# Patient Record
Sex: FEMALE | Race: OTHER | URBAN - METROPOLITAN AREA
[De-identification: names, ages, dates, MRNs, and addresses within clinical notes are randomized per-mention and may not be internally consistent; named-entity substitution may affect disease eponyms.]

---

## 2023-03-28 ENCOUNTER — INPATIENT (INPATIENT)
Facility: HOSPITAL | Age: 83
LOS: 12 days | Discharge: HOME HEALTH | DRG: 827 | End: 2023-04-10
Attending: SURGERY | Admitting: SURGERY
Payer: MEDICARE

## 2023-03-28 VITALS — WEIGHT: 119.93 LBS

## 2023-03-28 DIAGNOSIS — E78.5 HYPERLIPIDEMIA, UNSPECIFIED: ICD-10-CM

## 2023-03-28 DIAGNOSIS — C77.4 SECONDARY AND UNSPECIFIED MALIGNANT NEOPLASM OF INGUINAL AND LOWER LIMB LYMPH NODES: ICD-10-CM

## 2023-03-28 DIAGNOSIS — Z88.0 ALLERGY STATUS TO PENICILLIN: ICD-10-CM

## 2023-03-28 DIAGNOSIS — Z85.038 PERSONAL HISTORY OF OTHER MALIGNANT NEOPLASM OF LARGE INTESTINE: ICD-10-CM

## 2023-03-28 DIAGNOSIS — Z85.43 PERSONAL HISTORY OF MALIGNANT NEOPLASM OF OVARY: ICD-10-CM

## 2023-03-28 DIAGNOSIS — Z92.3 PERSONAL HISTORY OF IRRADIATION: ICD-10-CM

## 2023-03-28 DIAGNOSIS — Z90.79 ACQUIRED ABSENCE OF OTHER GENITAL ORGAN(S): ICD-10-CM

## 2023-03-28 DIAGNOSIS — R73.03 PREDIABETES: ICD-10-CM

## 2023-03-28 DIAGNOSIS — Z92.21 PERSONAL HISTORY OF ANTINEOPLASTIC CHEMOTHERAPY: ICD-10-CM

## 2023-03-28 DIAGNOSIS — C79.89 SECONDARY MALIGNANT NEOPLASM OF OTHER SPECIFIED SITES: ICD-10-CM

## 2023-03-28 DIAGNOSIS — L02.214 CUTANEOUS ABSCESS OF GROIN: ICD-10-CM

## 2023-03-28 DIAGNOSIS — Z90.722 ACQUIRED ABSENCE OF OVARIES, BILATERAL: ICD-10-CM

## 2023-03-28 DIAGNOSIS — Z90.710 ACQUIRED ABSENCE OF BOTH CERVIX AND UTERUS: ICD-10-CM

## 2023-03-28 DIAGNOSIS — F32.A DEPRESSION, UNSPECIFIED: ICD-10-CM

## 2023-03-28 LAB
ALBUMIN SERPL ELPH-MCNC: 3.6 G/DL — SIGNIFICANT CHANGE UP (ref 3.3–5)
ALP SERPL-CCNC: 84 U/L — SIGNIFICANT CHANGE UP (ref 40–120)
ALT FLD-CCNC: 19 U/L — SIGNIFICANT CHANGE UP (ref 12–78)
ANION GAP SERPL CALC-SCNC: 5 MMOL/L — SIGNIFICANT CHANGE UP (ref 5–17)
APPEARANCE UR: CLEAR — SIGNIFICANT CHANGE UP
AST SERPL-CCNC: 20 U/L — SIGNIFICANT CHANGE UP (ref 15–37)
BACTERIA # UR AUTO: ABNORMAL
BASOPHILS # BLD AUTO: 0.12 K/UL — SIGNIFICANT CHANGE UP (ref 0–0.2)
BASOPHILS NFR BLD AUTO: 1.5 % — SIGNIFICANT CHANGE UP (ref 0–2)
BILIRUB SERPL-MCNC: 0.6 MG/DL — SIGNIFICANT CHANGE UP (ref 0.2–1.2)
BILIRUB UR-MCNC: NEGATIVE — SIGNIFICANT CHANGE UP
BUN SERPL-MCNC: 14 MG/DL — SIGNIFICANT CHANGE UP (ref 7–23)
CALCIUM SERPL-MCNC: 10.3 MG/DL — HIGH (ref 8.5–10.1)
CHLORIDE SERPL-SCNC: 107 MMOL/L — SIGNIFICANT CHANGE UP (ref 96–108)
CO2 SERPL-SCNC: 27 MMOL/L — SIGNIFICANT CHANGE UP (ref 22–31)
COLOR SPEC: YELLOW — SIGNIFICANT CHANGE UP
COMMENT - URINE: SIGNIFICANT CHANGE UP
CREAT SERPL-MCNC: 0.83 MG/DL — SIGNIFICANT CHANGE UP (ref 0.5–1.3)
DIFF PNL FLD: NEGATIVE — SIGNIFICANT CHANGE UP
EGFR: 71 ML/MIN/1.73M2 — SIGNIFICANT CHANGE UP
EOSINOPHIL # BLD AUTO: 0.17 K/UL — SIGNIFICANT CHANGE UP (ref 0–0.5)
EOSINOPHIL NFR BLD AUTO: 2.2 % — SIGNIFICANT CHANGE UP (ref 0–6)
EPI CELLS # UR: ABNORMAL
FLUAV AG NPH QL: SIGNIFICANT CHANGE UP
FLUBV AG NPH QL: SIGNIFICANT CHANGE UP
GLUCOSE SERPL-MCNC: 168 MG/DL — HIGH (ref 70–99)
GLUCOSE UR QL: NEGATIVE — SIGNIFICANT CHANGE UP
HCT VFR BLD CALC: 40.1 % — SIGNIFICANT CHANGE UP (ref 34.5–45)
HGB BLD-MCNC: 13.5 G/DL — SIGNIFICANT CHANGE UP (ref 11.5–15.5)
IMM GRANULOCYTES NFR BLD AUTO: 0.3 % — SIGNIFICANT CHANGE UP (ref 0–0.9)
KETONES UR-MCNC: ABNORMAL
LACTATE SERPL-SCNC: 1 MMOL/L — SIGNIFICANT CHANGE UP (ref 0.7–2)
LEUKOCYTE ESTERASE UR-ACNC: ABNORMAL
LIDOCAIN IGE QN: 140 U/L — SIGNIFICANT CHANGE UP (ref 73–393)
LYMPHOCYTES # BLD AUTO: 1.37 K/UL — SIGNIFICANT CHANGE UP (ref 1–3.3)
LYMPHOCYTES # BLD AUTO: 17.6 % — SIGNIFICANT CHANGE UP (ref 13–44)
MCHC RBC-ENTMCNC: 30 PG — SIGNIFICANT CHANGE UP (ref 27–34)
MCHC RBC-ENTMCNC: 33.7 GM/DL — SIGNIFICANT CHANGE UP (ref 32–36)
MCV RBC AUTO: 89.1 FL — SIGNIFICANT CHANGE UP (ref 80–100)
MONOCYTES # BLD AUTO: 0.73 K/UL — SIGNIFICANT CHANGE UP (ref 0–0.9)
MONOCYTES NFR BLD AUTO: 9.4 % — SIGNIFICANT CHANGE UP (ref 2–14)
NEUTROPHILS # BLD AUTO: 5.36 K/UL — SIGNIFICANT CHANGE UP (ref 1.8–7.4)
NEUTROPHILS NFR BLD AUTO: 69 % — SIGNIFICANT CHANGE UP (ref 43–77)
NITRITE UR-MCNC: NEGATIVE — SIGNIFICANT CHANGE UP
PH UR: 6 — SIGNIFICANT CHANGE UP (ref 5–8)
PLATELET # BLD AUTO: 383 K/UL — SIGNIFICANT CHANGE UP (ref 150–400)
POTASSIUM SERPL-MCNC: 3.9 MMOL/L — SIGNIFICANT CHANGE UP (ref 3.5–5.3)
POTASSIUM SERPL-SCNC: 3.9 MMOL/L — SIGNIFICANT CHANGE UP (ref 3.5–5.3)
PROT SERPL-MCNC: 7.7 GM/DL — SIGNIFICANT CHANGE UP (ref 6–8.3)
PROT UR-MCNC: SIGNIFICANT CHANGE UP MG/DL
RBC # BLD: 4.5 M/UL — SIGNIFICANT CHANGE UP (ref 3.8–5.2)
RBC # FLD: 13.8 % — SIGNIFICANT CHANGE UP (ref 10.3–14.5)
RBC CASTS # UR COMP ASSIST: NEGATIVE /HPF — SIGNIFICANT CHANGE UP (ref 0–4)
RSV RNA NPH QL NAA+NON-PROBE: SIGNIFICANT CHANGE UP
SARS-COV-2 RNA SPEC QL NAA+PROBE: SIGNIFICANT CHANGE UP
SODIUM SERPL-SCNC: 139 MMOL/L — SIGNIFICANT CHANGE UP (ref 135–145)
SP GR SPEC: 1.02 — SIGNIFICANT CHANGE UP (ref 1.01–1.02)
UROBILINOGEN FLD QL: NEGATIVE — SIGNIFICANT CHANGE UP
WBC # BLD: 7.77 K/UL — SIGNIFICANT CHANGE UP (ref 3.8–10.5)
WBC # FLD AUTO: 7.77 K/UL — SIGNIFICANT CHANGE UP (ref 3.8–10.5)
WBC UR QL: SIGNIFICANT CHANGE UP /HPF (ref 0–5)

## 2023-03-28 PROCEDURE — 99285 EMERGENCY DEPT VISIT HI MDM: CPT

## 2023-03-28 RX ORDER — VANCOMYCIN HCL 1 G
1000 VIAL (EA) INTRAVENOUS ONCE
Refills: 0 | Status: COMPLETED | OUTPATIENT
Start: 2023-03-28 | End: 2023-03-28

## 2023-03-28 RX ORDER — MORPHINE SULFATE 50 MG/1
4 CAPSULE, EXTENDED RELEASE ORAL ONCE
Refills: 0 | Status: DISCONTINUED | OUTPATIENT
Start: 2023-03-28 | End: 2023-03-28

## 2023-03-28 RX ORDER — ONDANSETRON 8 MG/1
4 TABLET, FILM COATED ORAL ONCE
Refills: 0 | Status: DISCONTINUED | OUTPATIENT
Start: 2023-03-28 | End: 2023-04-10

## 2023-03-28 RX ORDER — SODIUM CHLORIDE 9 MG/ML
1000 INJECTION INTRAMUSCULAR; INTRAVENOUS; SUBCUTANEOUS ONCE
Refills: 0 | Status: COMPLETED | OUTPATIENT
Start: 2023-03-28 | End: 2023-03-28

## 2023-03-28 NOTE — ED STATDOCS - OBJECTIVE STATEMENT
83 yo female with ho ovarian cancer years ago with radiation. She is c/o right groin pain with open, drainaing, foul smelling purulent drainage. She was called in by nurse Mariely at [facility], where she is a patient and was told to come to [facility] today but she refused instead stating she was going to [place]. She was triaged but then disappeared and could not be found for over an hour. She is non toxic appearing and agitated

## 2023-03-28 NOTE — ED ADULT TRIAGE NOTE - CHIEF COMPLAINT QUOTE
Pt. presents to ED c/o abscess on her right upper groin. Pt. reports being a pt. at [place], has had radiation to area. Pt. reports lump has been there for appx. 2 months, recently became infected. Pt. on multiple antibiotics and was told to come to ED. Denies fevers, endorses pain to area.

## 2023-03-28 NOTE — ED STATDOCS - PHYSICAL EXAMINATION
Gen:  Well appearing in pain from right groin  Head:  NC/AT  HEENT: pupils perrl,no pharyngeal erythema, uvula midline  Cardiac: S1S2, RRR  Abd: Soft, right groin with open drainaing wound, purulent, foul smelling. area approx 7cm in diameter.  Resp: No distress, CTA   musculoskeletal:: no deformities, no swelling, strength +5/+5  Skin: warm and dry as visualized, no rashes  Neuro: SOSA, aao x 4  Psych:alert, cooperative, appropriate mood and affect for situation

## 2023-03-28 NOTE — ED STATDOCS - CLINICAL SUMMARY MEDICAL DECISION MAKING FREE TEXT BOX
pt with right groin open drainaing wound at the site of previous radiation therapy for ovarian cancer, she is a pt at [facility] but refused to go there earlier today as per nurse bay that called from [facility]. she is not septic will get labs including lactate and blood cultures, also get ct with iv contrast to see the extent of this open drainaing wound, no concern for necrotizinf fasciitsi will probably admit as she has failed op antibiotics pt with right groin open drainaing wound at the site of previous radiation therapy for ovarian cancer, she is a pt at [facility] but refused to go there earlier today as per nurse bay that called from [facility]. she is not septic will get labs including lactate and blood cultures, also get ct with iv contrast to see the extent of this open drainaing wound, no concern for necrotizinf fasciitsi will probably admit as she has failed op antibiotics    pt refused ctap all night, acnnot admit to surgery or medicine without ct, pt now at [id number] agrees to ctap, will give pain med NAOMY Bradley DO

## 2023-03-29 DIAGNOSIS — L02.214 CUTANEOUS ABSCESS OF GROIN: ICD-10-CM

## 2023-03-29 PROCEDURE — 88307 TISSUE EXAM BY PATHOLOGIST: CPT

## 2023-03-29 PROCEDURE — 93010 ELECTROCARDIOGRAM REPORT: CPT

## 2023-03-29 PROCEDURE — 99223 1ST HOSP IP/OBS HIGH 75: CPT

## 2023-03-29 PROCEDURE — 83735 ASSAY OF MAGNESIUM: CPT

## 2023-03-29 PROCEDURE — 97530 THERAPEUTIC ACTIVITIES: CPT | Mod: GP

## 2023-03-29 PROCEDURE — 36415 COLL VENOUS BLD VENIPUNCTURE: CPT

## 2023-03-29 PROCEDURE — 86901 BLOOD TYPING SEROLOGIC RH(D): CPT

## 2023-03-29 PROCEDURE — 87635 SARS-COV-2 COVID-19 AMP PRB: CPT

## 2023-03-29 PROCEDURE — 84100 ASSAY OF PHOSPHORUS: CPT

## 2023-03-29 PROCEDURE — 85025 COMPLETE CBC W/AUTO DIFF WBC: CPT

## 2023-03-29 PROCEDURE — 88305 TISSUE EXAM BY PATHOLOGIST: CPT

## 2023-03-29 PROCEDURE — 85027 COMPLETE CBC AUTOMATED: CPT

## 2023-03-29 PROCEDURE — 80048 BASIC METABOLIC PNL TOTAL CA: CPT

## 2023-03-29 PROCEDURE — 97163 PT EVAL HIGH COMPLEX 45 MIN: CPT | Mod: GP

## 2023-03-29 PROCEDURE — 80053 COMPREHEN METABOLIC PANEL: CPT

## 2023-03-29 PROCEDURE — 85610 PROTHROMBIN TIME: CPT

## 2023-03-29 PROCEDURE — 86900 BLOOD TYPING SEROLOGIC ABO: CPT

## 2023-03-29 PROCEDURE — 86920 COMPATIBILITY TEST SPIN: CPT

## 2023-03-29 PROCEDURE — 88360 TUMOR IMMUNOHISTOCHEM/MANUAL: CPT

## 2023-03-29 PROCEDURE — 86850 RBC ANTIBODY SCREEN: CPT

## 2023-03-29 PROCEDURE — 97116 GAIT TRAINING THERAPY: CPT | Mod: GP

## 2023-03-29 PROCEDURE — 97162 PT EVAL MOD COMPLEX 30 MIN: CPT | Mod: GP

## 2023-03-29 PROCEDURE — 74177 CT ABD & PELVIS W/CONTRAST: CPT | Mod: 26,MA

## 2023-03-29 RX ORDER — SODIUM CHLORIDE 9 MG/ML
1000 INJECTION, SOLUTION INTRAVENOUS
Refills: 0 | Status: DISCONTINUED | OUTPATIENT
Start: 2023-03-29 | End: 2023-03-29

## 2023-03-29 RX ORDER — ENOXAPARIN SODIUM 100 MG/ML
40 INJECTION SUBCUTANEOUS
Refills: 0 | Status: DISCONTINUED | OUTPATIENT
Start: 2023-03-29 | End: 2023-04-10

## 2023-03-29 RX ORDER — ACETAMINOPHEN 500 MG
750 TABLET ORAL EVERY 6 HOURS
Refills: 0 | Status: DISCONTINUED | OUTPATIENT
Start: 2023-03-29 | End: 2023-04-06

## 2023-03-29 RX ADMIN — SODIUM CHLORIDE 1000 MILLILITER(S): 9 INJECTION INTRAMUSCULAR; INTRAVENOUS; SUBCUTANEOUS at 00:25

## 2023-03-29 RX ADMIN — SODIUM CHLORIDE 100 MILLILITER(S): 9 INJECTION, SOLUTION INTRAVENOUS at 15:33

## 2023-03-29 RX ADMIN — Medication 100 MILLIGRAM(S): at 21:21

## 2023-03-29 RX ADMIN — Medication 250 MILLIGRAM(S): at 00:29

## 2023-03-29 NOTE — ED ADULT NURSE NOTE - CHIEF COMPLAINT QUOTE
Pt. presents to ED c/o abscess on her right upper groin. Pt. reports being a pt. at [facility], has had radiation to area. Pt. reports lump has been there for appx. 2 months, recently became infected. Pt. on multiple antibiotics and was told to come to ED. Denies fevers, endorses pain to area.

## 2023-03-29 NOTE — H&P ADULT - NSHPLABSRESULTS_GEN_ALL_CORE
13.5                     139  | 27   | 14           7.77  >-----------< 383     ------------------------< 168<H>                    40.1                      3.9  | 107  | 0.83                                                                      Ca 10.3<H> Mg x     Ph x          CT Abdomen and Pelvis w/ IV Cont (03.29.23 @ 06:05)       FINDINGS:  LOWER CHEST: Mild bibasilar dependent atelectasis.    LIVER: 1.3 cm cyst in the inferior right lobe.  BILE DUCTS: Normal caliber.  GALLBLADDER: Within normal limits.  SPLEEN: Within normal limits.  PANCREAS: Within normal limits.  ADRENALS: Within normal limits.  KIDNEYS/URETERS: Within normal limits.    BLADDER: Within normal limits.  REPRODUCTIVE ORGANS: Hysterectomy.    BOWEL: No bowel obstruction. Appendix is normal.. Mild colonic stool   burden. The stomach is decompressed and cannot be assessed.  PERITONEUM: No ascites. 2.6 x 1.8 cm right pelvic sidewall cystic   structure (2:69).  VESSELS: Atherosclerotic changes.  RETROPERITONEUM/LYMPH NODES: 7 x 4 mm left external iliac lymph node   ABDOMINAL WALL: 3.6 x 3.0 cm left inguinal soft tissue mass. 3.4 x 2.8 x   3 cm rim enhancing fluid collection in the right  groin with discontinuity of the surrounding skin suggestive of draining   abscess.  Postoperative changes.  BONES: Moderate degenerative changes. Levoscoliosis.    IMPRESSION:  3.4 x 2.8 x 3 cm rim enhancing fluid collection in the right groin with  discontinuity of the surrounding skin suggestive of draining abscess.    3.6 x 3.0 cm left inguinal soft tissue mass suspicious for malignancy.    Indeterminant right pelvic sidewall cystic structure measuring 2.6 x 1.8 cm

## 2023-03-29 NOTE — ED ADULT NURSE NOTE - OBJECTIVE STATEMENT
Pt. presents to ED c/o abscess on her right upper groin. Pt. reports being a pt. at [facility], has had radiation to area. Pt. reports lump has been there for appx. 2 months, recently became infected. Pt. on multiple antibiotics and was told to come to ED. Denies fevers, endorses pain to area.
no

## 2023-03-29 NOTE — H&P ADULT - HISTORY OF PRESENT ILLNESS
Pt is a 82 F, with a medical history listed below, was told to come to the hospital by her physician, Dr Clarke for possible drainage of a right groin abscess. Pt was diagnosed with Ovarian Ca 13 yrs ago for which she was had hysterectomy with BSO at [facility] 13 yrs ago; pt does not remember the name of Surgeon. Pt also underwent chemotherapy. About 2 months ago, Pt stated that she noticed a mass in her right groin. The area started to drain purulent fluid in weeks to come then evolved into a open wound for which she needed wound care to com to the house. Pt stated that Dr Clarke sent her for a CT pelvis and said that they could not drain it in the office and sent her here.  Pt currently alone due to the sudden death of her  7 yrs ago and then the passing of her daughter 7 months after that due to cancer.    PmHx: Pre-Diabetes, Ovarian Cancer, Depression  PsHx: PITA/BSO (13 yrs ago at [facility])  All: PCN         Pt is a 82 F, with a medical history listed below, was told to come to the hospital by her physician, Dr Clarke for possible drainage of a right groin abscess. Pt was diagnosed with Ovarian Ca 13 yrs ago for which she was had hysterectomy with BSO at [facility] 13 yrs ago; pt does not remember the name of Surgeon. Pt also underwent chemotherapy and last completed a cycle about one month ago. About 2 months ago, pt stated that she noticed a mass in her right groin. Pt stated she has had multiple tests done on her and believes she had a biopsy of the left groin. The area started to drain purulent fluid in weeks to come then evolved into a open wound for which she needed wound care to com to the house. Pt stated that Dr Clarke sent her for a CT pelvis and said that they could not drain it in the office and sent her here.  Pt currently alone due to the sudden death of her  7 yrs ago and then the passing of her daughter 7 months after that due to cancer.    PmHx: Pre-Diabetes, Ovarian Cancer, Depression  PsHx: PITA/BSO (13 yrs ago at [facility])  All: PCN

## 2023-03-29 NOTE — H&P ADULT - NSHPPHYSICALEXAM_GEN_ALL_CORE
Gen: Alert, NAD, Elderly  CV: s1s2 present,  Pulm: No work of breathing, equal chest rise  GI: Soft, non distended. Well healed surgical scar  Groin: Palpable mass in left groin, Open wound with fibrinous exudate at base of wound  Extremities: Gen: Alert, NAD, Elderly  CV: s1s2 present,  Pulm: No work of breathing, equal chest rise  GI: Soft, non distended. Well healed surgical scar  Groin: Palpable mass in left groin hard and somewhat fixed, Open wound with fibrinous exudate at base of wound  Extremities: No skin lesions on bilateral legs or unusual moles

## 2023-03-29 NOTE — H&P ADULT - ASSESSMENT
83 YO F with abscess of right groin and mass left groin    Plan:   Admit to Surgical Oncology- Dr Davis  IV Antibiotics  Possible biopsy of left groin mass  Possible drainage of Right groin abscess today vs tmr    Case discussed with Dr Davis

## 2023-03-29 NOTE — PATIENT PROFILE ADULT - FALL HARM RISK - HARM RISK INTERVENTIONS
Assistance with ambulation/Assistance OOB with selected safe patient handling equipment/Communicate Risk of Fall with Harm to all staff/Discuss with provider need for PT consult/Monitor gait and stability/Reinforce activity limits and safety measures with patient and family/Tailored Fall Risk Interventions/Use of alarms - bed, chair and/or voice tab/Visual Cue: Yellow wristband and red socks/Bed in lowest position, wheels locked, appropriate side rails in place/Call bell, personal items and telephone in reach/Instruct patient to call for assistance before getting out of bed or chair/Non-slip footwear when patient is out of bed/[place] to call system/Physically safe environment - no spills, clutter or unnecessary equipment/Purposeful Proactive Rounding/Room/bathroom lighting operational, light cord in reach

## 2023-03-29 NOTE — H&P ADULT - ATTENDING COMMENTS
Patient with open wound in radiated right groin that has likely necrotic tumor nodes, growing down to femoral vessels. Risk for blowout. Will arrange for vascular surgery and plastics to help debride, reconstruct area.

## 2023-03-30 LAB
ALBUMIN SERPL ELPH-MCNC: 2.6 G/DL — LOW (ref 3.3–5)
ALP SERPL-CCNC: 68 U/L — SIGNIFICANT CHANGE UP (ref 40–120)
ALT FLD-CCNC: 17 U/L — SIGNIFICANT CHANGE UP (ref 12–78)
ANION GAP SERPL CALC-SCNC: 6 MMOL/L — SIGNIFICANT CHANGE UP (ref 5–17)
AST SERPL-CCNC: 17 U/L — SIGNIFICANT CHANGE UP (ref 15–37)
BASOPHILS # BLD AUTO: 0.09 K/UL — SIGNIFICANT CHANGE UP (ref 0–0.2)
BASOPHILS NFR BLD AUTO: 1.7 % — SIGNIFICANT CHANGE UP (ref 0–2)
BILIRUB SERPL-MCNC: 0.6 MG/DL — SIGNIFICANT CHANGE UP (ref 0.2–1.2)
BUN SERPL-MCNC: 11 MG/DL — SIGNIFICANT CHANGE UP (ref 7–23)
CALCIUM SERPL-MCNC: 8.8 MG/DL — SIGNIFICANT CHANGE UP (ref 8.5–10.1)
CHLORIDE SERPL-SCNC: 109 MMOL/L — HIGH (ref 96–108)
CO2 SERPL-SCNC: 25 MMOL/L — SIGNIFICANT CHANGE UP (ref 22–31)
CREAT SERPL-MCNC: 0.58 MG/DL — SIGNIFICANT CHANGE UP (ref 0.5–1.3)
EGFR: 91 ML/MIN/1.73M2 — SIGNIFICANT CHANGE UP
EOSINOPHIL # BLD AUTO: 0.43 K/UL — SIGNIFICANT CHANGE UP (ref 0–0.5)
EOSINOPHIL NFR BLD AUTO: 8 % — HIGH (ref 0–6)
GLUCOSE SERPL-MCNC: 127 MG/DL — HIGH (ref 70–99)
HCT VFR BLD CALC: 35 % — SIGNIFICANT CHANGE UP (ref 34.5–45)
HGB BLD-MCNC: 11.6 G/DL — SIGNIFICANT CHANGE UP (ref 11.5–15.5)
IMM GRANULOCYTES NFR BLD AUTO: 0.4 % — SIGNIFICANT CHANGE UP (ref 0–0.9)
LYMPHOCYTES # BLD AUTO: 0.81 K/UL — LOW (ref 1–3.3)
LYMPHOCYTES # BLD AUTO: 15.1 % — SIGNIFICANT CHANGE UP (ref 13–44)
MAGNESIUM SERPL-MCNC: 1.9 MG/DL — SIGNIFICANT CHANGE UP (ref 1.6–2.6)
MCHC RBC-ENTMCNC: 29.8 PG — SIGNIFICANT CHANGE UP (ref 27–34)
MCHC RBC-ENTMCNC: 33.1 GM/DL — SIGNIFICANT CHANGE UP (ref 32–36)
MCV RBC AUTO: 90 FL — SIGNIFICANT CHANGE UP (ref 80–100)
MONOCYTES # BLD AUTO: 0.78 K/UL — SIGNIFICANT CHANGE UP (ref 0–0.9)
MONOCYTES NFR BLD AUTO: 14.6 % — HIGH (ref 2–14)
NEUTROPHILS # BLD AUTO: 3.22 K/UL — SIGNIFICANT CHANGE UP (ref 1.8–7.4)
NEUTROPHILS NFR BLD AUTO: 60.2 % — SIGNIFICANT CHANGE UP (ref 43–77)
PHOSPHATE SERPL-MCNC: 4.2 MG/DL — SIGNIFICANT CHANGE UP (ref 2.5–4.5)
PLATELET # BLD AUTO: 292 K/UL — SIGNIFICANT CHANGE UP (ref 150–400)
POTASSIUM SERPL-MCNC: 3.6 MMOL/L — SIGNIFICANT CHANGE UP (ref 3.5–5.3)
POTASSIUM SERPL-SCNC: 3.6 MMOL/L — SIGNIFICANT CHANGE UP (ref 3.5–5.3)
PROT SERPL-MCNC: 6.1 GM/DL — SIGNIFICANT CHANGE UP (ref 6–8.3)
RBC # BLD: 3.89 M/UL — SIGNIFICANT CHANGE UP (ref 3.8–5.2)
RBC # FLD: 13.8 % — SIGNIFICANT CHANGE UP (ref 10.3–14.5)
SODIUM SERPL-SCNC: 140 MMOL/L — SIGNIFICANT CHANGE UP (ref 135–145)
WBC # BLD: 5.35 K/UL — SIGNIFICANT CHANGE UP (ref 3.8–10.5)
WBC # FLD AUTO: 5.35 K/UL — SIGNIFICANT CHANGE UP (ref 3.8–10.5)

## 2023-03-30 PROCEDURE — 99232 SBSQ HOSP IP/OBS MODERATE 35: CPT

## 2023-03-30 RX ADMIN — Medication 100 MILLIGRAM(S): at 21:30

## 2023-03-30 RX ADMIN — Medication 100 MILLIGRAM(S): at 05:26

## 2023-03-30 RX ADMIN — Medication 100 MILLIGRAM(S): at 15:31

## 2023-03-30 NOTE — PROGRESS NOTE ADULT - SUBJECTIVE AND OBJECTIVE BOX
SURGERY DAILY PROGRESS NOTE:     Subjective:  Patient seen and examined at bedside during am rounds. No complaints. AVSS. Denies any fevers, chills, n/v/d, chest pain or shortness of breath    Objective:  Vital Signs Last 24 Hrs  T(C): 36.7 (30 Mar 2023 09:19), Max: 37 (29 Mar 2023 15:13)  T(F): 98.1 (30 Mar 2023 09:19), Max: 98.6 (29 Mar 2023 15:13)  HR: 71 (30 Mar 2023 09:19) (69 - 72)  BP: 128/58 (30 Mar 2023 09:19) (128/58 - 159/67)  BP(mean): 90 (30 Mar 2023 05:01) (90 - 90)  RR: 18 (30 Mar 2023 09:19) (18 - 18)  SpO2: 99% (30 Mar 2023 09:19) (96% - 100%)    Parameters below as of 30 Mar 2023 09:19  Patient On (Oxygen Delivery Method): room air      PHYSICAL EXAM   Gen: well-appearing, in no acute distress  CV: pulse regularly present   Resp: airway patent, non-labored breathing  Abd: soft, NTND; no rebound or guarding. Palpable mass in left groin hard and somewhat fixed, Open wound with fibrinous exudate at base of wound        MEDICATIONS  (STANDING):  clindamycin IVPB 600 milliGRAM(s) IV Intermittent every 8 hours  enoxaparin Injectable 40 milliGRAM(s) SubCutaneous <User Schedule>  ondansetron Injectable 4 milliGRAM(s) IV Push once    MEDICATIONS  (PRN):  acetaminophen   IVPB .. 750 milliGRAM(s) IV Intermittent every 6 hours PRN Temp greater or equal to 38C (100.4F), Mild Pain (1 - 3)        LABS:                        11.6   5.35  )-----------( 292      ( 30 Mar 2023 06:46 )             35.0     03-30    140  |  109<H>  |  11  ----------------------------<  127<H>  3.6   |  25  |  0.58    Ca    8.8      30 Mar 2023 06:46  Phos  4.2     03-30  Mg     1.9     03-30    TPro  6.1  /  Alb  2.6<L>  /  TBili  0.6  /  DBili  x   /  AST  17  /  ALT  17  /  AlkPhos  68  -      Urinalysis Basic - ( 28 Mar 2023 22:38 )    Color: Yellow / Appearance: Clear / S.020 / pH: x  Gluc: x / Ketone: Trace  / Bili: Negative / Urobili: Negative   Blood: x / Protein: Trace mg/dL / Nitrite: Negative   Leuk Esterase: Trace / RBC: Negative /HPF / WBC 0-2 /HPF   Sq Epi: x / Non Sq Epi: Moderate / Bacteria: Few

## 2023-03-30 NOTE — CONSULT NOTE ADULT - ASSESSMENT
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 - She is c/o right groin pain with open, draining, foul smelling purulent drainage    # h/o recurrent serous ovarian cancer   - pt last received doxil/avastin on 1/27/23  - doxil was dose reduced because of poor tolerance   - @ [facility] [place] 2/22 CT C/A/P showed since 2/9 there was slightly decreased b/l inguinal adenopathy with new skin defect in right groin abutting the right inguinal LN  - @ [facility] [place] - 3/15 CT pelvis showed since 2/22/23 slightly increased size of necrotic right inguinal eric metastasis w/ increased fluid component and new foci of air w/ slightly increased soft tissue inflammatory change, unchanged inguinal eric and right external iliac/deep inguinal eric mets, slightly increased cystic lesion with unchanged adjacent solid component in right pelvis, possibly implant.     # right groin abscess  - WBC 7.7, Hb 13.5, plt 383, UA neg - now s/p vancomycin - currently on clindamycin  - CT to r/o abscess- 3.4 x 2.8 x 3 cm rim enhancing fluid collection in the right groin with discontinuity of the surrounding skin suggestive of draining abscess. 3.6 x 3.0 cm left inguinal soft tissue mass suspicious for malignancy. Indeterminant right pelvic sidewall cystic structure measuring 2.6 x 1.8 cm.  - given morphine for pain- currently on tylenol   - Dr. Davis to drain right groin abscess   - c/w clindamycin     will discuss with Dr. Clarke at [facility]

## 2023-03-31 LAB
ALBUMIN SERPL ELPH-MCNC: 2.7 G/DL — LOW (ref 3.3–5)
ALP SERPL-CCNC: 66 U/L — SIGNIFICANT CHANGE UP (ref 40–120)
ALT FLD-CCNC: 20 U/L — SIGNIFICANT CHANGE UP (ref 12–78)
ANION GAP SERPL CALC-SCNC: 6 MMOL/L — SIGNIFICANT CHANGE UP (ref 5–17)
AST SERPL-CCNC: 18 U/L — SIGNIFICANT CHANGE UP (ref 15–37)
BASOPHILS # BLD AUTO: 0.09 K/UL — SIGNIFICANT CHANGE UP (ref 0–0.2)
BASOPHILS NFR BLD AUTO: 1.8 % — SIGNIFICANT CHANGE UP (ref 0–2)
BILIRUB SERPL-MCNC: 0.4 MG/DL — SIGNIFICANT CHANGE UP (ref 0.2–1.2)
BUN SERPL-MCNC: 16 MG/DL — SIGNIFICANT CHANGE UP (ref 7–23)
CALCIUM SERPL-MCNC: 8.7 MG/DL — SIGNIFICANT CHANGE UP (ref 8.5–10.1)
CHLORIDE SERPL-SCNC: 110 MMOL/L — HIGH (ref 96–108)
CO2 SERPL-SCNC: 22 MMOL/L — SIGNIFICANT CHANGE UP (ref 22–31)
CREAT SERPL-MCNC: 0.5 MG/DL — SIGNIFICANT CHANGE UP (ref 0.5–1.3)
EGFR: 94 ML/MIN/1.73M2 — SIGNIFICANT CHANGE UP
EOSINOPHIL # BLD AUTO: 0.4 K/UL — SIGNIFICANT CHANGE UP (ref 0–0.5)
EOSINOPHIL NFR BLD AUTO: 8.2 % — HIGH (ref 0–6)
GLUCOSE SERPL-MCNC: 143 MG/DL — HIGH (ref 70–99)
HCT VFR BLD CALC: 34 % — LOW (ref 34.5–45)
HGB BLD-MCNC: 11.3 G/DL — LOW (ref 11.5–15.5)
IMM GRANULOCYTES NFR BLD AUTO: 0.2 % — SIGNIFICANT CHANGE UP (ref 0–0.9)
LYMPHOCYTES # BLD AUTO: 0.74 K/UL — LOW (ref 1–3.3)
LYMPHOCYTES # BLD AUTO: 15.1 % — SIGNIFICANT CHANGE UP (ref 13–44)
MAGNESIUM SERPL-MCNC: 1.9 MG/DL — SIGNIFICANT CHANGE UP (ref 1.6–2.6)
MCHC RBC-ENTMCNC: 29.7 PG — SIGNIFICANT CHANGE UP (ref 27–34)
MCHC RBC-ENTMCNC: 33.2 GM/DL — SIGNIFICANT CHANGE UP (ref 32–36)
MCV RBC AUTO: 89.5 FL — SIGNIFICANT CHANGE UP (ref 80–100)
MONOCYTES # BLD AUTO: 0.66 K/UL — SIGNIFICANT CHANGE UP (ref 0–0.9)
MONOCYTES NFR BLD AUTO: 13.5 % — SIGNIFICANT CHANGE UP (ref 2–14)
NEUTROPHILS # BLD AUTO: 2.99 K/UL — SIGNIFICANT CHANGE UP (ref 1.8–7.4)
NEUTROPHILS NFR BLD AUTO: 61.2 % — SIGNIFICANT CHANGE UP (ref 43–77)
PHOSPHATE SERPL-MCNC: 4.2 MG/DL — SIGNIFICANT CHANGE UP (ref 2.5–4.5)
PLATELET # BLD AUTO: 289 K/UL — SIGNIFICANT CHANGE UP (ref 150–400)
POTASSIUM SERPL-MCNC: 3.7 MMOL/L — SIGNIFICANT CHANGE UP (ref 3.5–5.3)
POTASSIUM SERPL-SCNC: 3.7 MMOL/L — SIGNIFICANT CHANGE UP (ref 3.5–5.3)
PROT SERPL-MCNC: 6 GM/DL — SIGNIFICANT CHANGE UP (ref 6–8.3)
RBC # BLD: 3.8 M/UL — SIGNIFICANT CHANGE UP (ref 3.8–5.2)
RBC # FLD: 13.6 % — SIGNIFICANT CHANGE UP (ref 10.3–14.5)
SODIUM SERPL-SCNC: 138 MMOL/L — SIGNIFICANT CHANGE UP (ref 135–145)
WBC # BLD: 4.89 K/UL — SIGNIFICANT CHANGE UP (ref 3.8–10.5)
WBC # FLD AUTO: 4.89 K/UL — SIGNIFICANT CHANGE UP (ref 3.8–10.5)

## 2023-03-31 PROCEDURE — 99232 SBSQ HOSP IP/OBS MODERATE 35: CPT

## 2023-03-31 RX ADMIN — Medication 100 MILLIGRAM(S): at 05:30

## 2023-03-31 NOTE — PROGRESS NOTE ADULT - ASSESSMENT
81 YO F with abscess of right groin and mass left groin, with remote history of ovarian cancer s/p PITA/BSO  wbc 5    Plan:  cont local wound car for now  d/c IV abx  OR Tuesday for excision of mass along with Vascular and Plastic surgery consults  cont pain control      Pt seen and examined w/ d/w Dr Davis

## 2023-03-31 NOTE — PHYSICAL THERAPY INITIAL EVALUATION ADULT - NSACTIVITYREC_GEN_A_PT
25 min eval. Pt did not demonstrate a need for Acute Care PT treatment at this time. But Has steps at home an after a Groin Procedure should have PT clearance for stairs. A new order should be ordered when medically ready to be assessed. Thank you.

## 2023-03-31 NOTE — PROGRESS NOTE ADULT - SUBJECTIVE AND OBJECTIVE BOX
INTERVAL HPI/OVERNIGHT EVENTS:  Patient S&E at bedside. No o/n events,     PAST MEDICAL & SURGICAL HISTORY:  Ovarian cancer          FAMILY HISTORY:      VITAL SIGNS:  T(F): 97.7 (03-31-23 @ 08:39)  HR: 67 (03-31-23 @ 08:39)  BP: 134/64 (03-31-23 @ 08:39)  RR: 18 (03-31-23 @ 08:39)  SpO2: 96% (03-31-23 @ 08:39)  Wt(kg): --    PHYSICAL EXAM:  GENERAL: NAD,  HEAD:  Atraumatic,  EYES: EOMI,  CHEST/LUNG: Clear to auscultation bilaterally; No wheeze  HEART: Regular rate and rhythm; No murmurs  ABDOMEN: Soft, Nontender, Nondistended;  EXTREMITIES:  no edema  NEUROLOGY: non-focal  SKIN: right groin 1x1 cm cavity with pus coming from groin       MEDICATIONS  (STANDING):  enoxaparin Injectable 40 milliGRAM(s) SubCutaneous <User Schedule>  ondansetron Injectable 4 milliGRAM(s) IV Push once    MEDICATIONS  (PRN):  acetaminophen   IVPB .. 750 milliGRAM(s) IV Intermittent every 6 hours PRN Temp greater or equal to 38C (100.4F), Mild Pain (1 - 3)      Allergies    penicillin (Unknown)    Intolerances        LABS:                        11.3   4.89  )-----------( 289      ( 31 Mar 2023 06:55 )             34.0     03-31    138  |  110<H>  |  16  ----------------------------<  143<H>  3.7   |  22  |  0.50    Ca    8.7      31 Mar 2023 06:55  Phos  4.2     03-31  Mg     1.9     03-31    TPro  6.0  /  Alb  2.7<L>  /  TBili  0.4  /  DBili  x   /  AST  18  /  ALT  20  /  AlkPhos  66  03-31          RADIOLOGY & ADDITIONAL TESTS:  Studies reviewed.

## 2023-03-31 NOTE — PHYSICAL THERAPY INITIAL EVALUATION ADULT - PERTINENT HX OF CURRENT PROBLEM, REHAB EVAL
As per chart review, 83 Y/O F with abscess of right groin and mass left groin, with remote history of ovarian cancer s/p PITA/BSO.  Possible biopsy of left groin mass. 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  3.6 x 3.0 cm left inguinal soft tissue mass suspicious for malignancy.  Indeterminant right pelvic sidewall cystic structure measuring 2.6 x 1.8 cm

## 2023-03-31 NOTE — PROGRESS NOTE ADULT - SUBJECTIVE AND OBJECTIVE BOX
SURGERY DAILY PROGRESS NOTE:     Subjective:  Patient seen and examined at bedside during am rounds. Pt had a lot of concerns and they were addressed along with her son. Pt also complained of overuse of abx and making her anxious. AVSS. Denies any fevers, chills, n/v/d, chest pain or shortness of breath    Objective:    MEDICATIONS  (STANDING):  enoxaparin Injectable 40 milliGRAM(s) SubCutaneous <User Schedule>  ondansetron Injectable 4 milliGRAM(s) IV Push once    MEDICATIONS  (PRN):  acetaminophen   IVPB .. 750 milliGRAM(s) IV Intermittent every 6 hours PRN Temp greater or equal to 38C (100.4F), Mild Pain (1 - 3)      Vital Signs Last 24 Hrs  T(C): 36.5 (31 Mar 2023 08:39), Max: 36.9 (30 Mar 2023 21:13)  T(F): 97.7 (31 Mar 2023 08:39), Max: 98.5 (30 Mar 2023 21:13)  HR: 67 (31 Mar 2023 08:39) (67 - 83)  BP: 134/64 (31 Mar 2023 08:39) (120/59 - 138/68)  BP(mean): --  RR: 18 (31 Mar 2023 08:39) (18 - 19)  SpO2: 96% (31 Mar 2023 08:39) (96% - 99%)    Parameters below as of 31 Mar 2023 08:39  Patient On (Oxygen Delivery Method): room air          PHYSICAL EXAM   Gen: well-appearing, in no acute distress  CV: pulse regularly present   Resp: airway patent, non-labored breathing  Abd: soft, NTND; no rebound or guarding. Palpable mass in left groin hard and somewhat fixed, Open wound with fibrinous exudate at base of wound, moist gauze applied  ext. no cyanosis or edema, DP pulse palpable.     I&O's Detail      Daily     Daily     LABS:                        11.3   4.89  )-----------( 289      ( 31 Mar 2023 06:55 )             34.0     03-31    138  |  110<H>  |  16  ----------------------------<  143<H>  3.7   |  22  |  0.50    Ca    8.7      31 Mar 2023 06:55  Phos  4.2     03-31  Mg     1.9     03-31    TPro  6.0  /  Alb  2.7<L>  /  TBili  0.4  /  DBili  x   /  AST  18  /  ALT  20  /  AlkPhos  66  03-31          RADIOLOGY & ADDITIONAL STUDIES:    ASSESSMENT/PLAN:

## 2023-03-31 NOTE — PROGRESS NOTE ADULT - ASSESSMENT
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 - She is c/o right groin pain with open, draining, foul smelling purulent drainage    # h/o recurrent serous ovarian cancer   - pt last received doxil/avastin on 1/27/23  - doxil was dose reduced because of poor tolerance   - @ [facility] [place] 2/22 CT C/A/P showed since 2/9 there was slightly decreased b/l inguinal adenopathy with new skin defect in right groin abutting the right inguinal LN  - @ [facility] [place] - 3/15 CT pelvis showed since 2/22/23 slightly increased size of necrotic right inguinal eric metastasis w/ increased fluid component and new foci of air w/ slightly increased soft tissue inflammatory change, unchanged inguinal eric and right external iliac/deep inguinal eric mets, slightly increased cystic lesion with unchanged adjacent solid component in right pelvis, possibly implant.     # right groin abscess  - WBC 7.7, Hb 13.5, plt 383, UA neg - now s/p vancomycin - currently on clindamycin  - CT to r/o abscess- 3.4 x 2.8 x 3 cm rim enhancing fluid collection in the right groin with discontinuity of the surrounding skin suggestive of draining abscess. 3.6 x 3.0 cm left inguinal soft tissue mass suspicious for malignancy. Indeterminant right pelvic sidewall cystic structure measuring 2.6 x 1.8 cm.  - given morphine for pain- currently on tylenol   - Dr. Susan Dr, Karpeh, Dr. Gennaro all on board for surgery on Tuesday for excision of mass along with Vascular and Plastic surgery consults- needs radical dissection of groin and muscle flap  - off antibiotics now     discussed with Dr. Clarke at [facility] [place]

## 2023-04-01 LAB
ANION GAP SERPL CALC-SCNC: 5 MMOL/L — SIGNIFICANT CHANGE UP (ref 5–17)
BASOPHILS # BLD AUTO: 0.08 K/UL — SIGNIFICANT CHANGE UP (ref 0–0.2)
BASOPHILS NFR BLD AUTO: 1.6 % — SIGNIFICANT CHANGE UP (ref 0–2)
BUN SERPL-MCNC: 14 MG/DL — SIGNIFICANT CHANGE UP (ref 7–23)
CALCIUM SERPL-MCNC: 8.8 MG/DL — SIGNIFICANT CHANGE UP (ref 8.5–10.1)
CHLORIDE SERPL-SCNC: 112 MMOL/L — HIGH (ref 96–108)
CO2 SERPL-SCNC: 25 MMOL/L — SIGNIFICANT CHANGE UP (ref 22–31)
CREAT SERPL-MCNC: 0.63 MG/DL — SIGNIFICANT CHANGE UP (ref 0.5–1.3)
EGFR: 89 ML/MIN/1.73M2 — SIGNIFICANT CHANGE UP
EOSINOPHIL # BLD AUTO: 0.48 K/UL — SIGNIFICANT CHANGE UP (ref 0–0.5)
EOSINOPHIL NFR BLD AUTO: 9.8 % — HIGH (ref 0–6)
GLUCOSE SERPL-MCNC: 127 MG/DL — HIGH (ref 70–99)
HCT VFR BLD CALC: 35.4 % — SIGNIFICANT CHANGE UP (ref 34.5–45)
HGB BLD-MCNC: 11.7 G/DL — SIGNIFICANT CHANGE UP (ref 11.5–15.5)
IMM GRANULOCYTES NFR BLD AUTO: 0.2 % — SIGNIFICANT CHANGE UP (ref 0–0.9)
LYMPHOCYTES # BLD AUTO: 0.89 K/UL — LOW (ref 1–3.3)
LYMPHOCYTES # BLD AUTO: 18.2 % — SIGNIFICANT CHANGE UP (ref 13–44)
MAGNESIUM SERPL-MCNC: 2 MG/DL — SIGNIFICANT CHANGE UP (ref 1.6–2.6)
MCHC RBC-ENTMCNC: 29.5 PG — SIGNIFICANT CHANGE UP (ref 27–34)
MCHC RBC-ENTMCNC: 33.1 GM/DL — SIGNIFICANT CHANGE UP (ref 32–36)
MCV RBC AUTO: 89.2 FL — SIGNIFICANT CHANGE UP (ref 80–100)
MONOCYTES # BLD AUTO: 0.66 K/UL — SIGNIFICANT CHANGE UP (ref 0–0.9)
MONOCYTES NFR BLD AUTO: 13.5 % — SIGNIFICANT CHANGE UP (ref 2–14)
NEUTROPHILS # BLD AUTO: 2.76 K/UL — SIGNIFICANT CHANGE UP (ref 1.8–7.4)
NEUTROPHILS NFR BLD AUTO: 56.7 % — SIGNIFICANT CHANGE UP (ref 43–77)
PHOSPHATE SERPL-MCNC: 3.8 MG/DL — SIGNIFICANT CHANGE UP (ref 2.5–4.5)
PLATELET # BLD AUTO: 325 K/UL — SIGNIFICANT CHANGE UP (ref 150–400)
POTASSIUM SERPL-MCNC: 3.8 MMOL/L — SIGNIFICANT CHANGE UP (ref 3.5–5.3)
POTASSIUM SERPL-SCNC: 3.8 MMOL/L — SIGNIFICANT CHANGE UP (ref 3.5–5.3)
RBC # BLD: 3.97 M/UL — SIGNIFICANT CHANGE UP (ref 3.8–5.2)
RBC # FLD: 13.6 % — SIGNIFICANT CHANGE UP (ref 10.3–14.5)
SODIUM SERPL-SCNC: 142 MMOL/L — SIGNIFICANT CHANGE UP (ref 135–145)
WBC # BLD: 4.88 K/UL — SIGNIFICANT CHANGE UP (ref 3.8–10.5)
WBC # FLD AUTO: 4.88 K/UL — SIGNIFICANT CHANGE UP (ref 3.8–10.5)

## 2023-04-01 NOTE — PROGRESS NOTE ADULT - ASSESSMENT
An 82 year old female with abscess of right groin and mass left groin, with remote history of ovarian cancer s/p PITA/BSO    Plan:  cont local wound car for now  d/c IV abx  OR Tuesday for excision of mass along with Vascular and Plastic surgery consults  cont pain control  Patient is asking to be discharged today and come back on Tuesday for surgery    Pt seen and examined with Dr Wilson

## 2023-04-01 NOTE — PROGRESS NOTE ADULT - SUBJECTIVE AND OBJECTIVE BOX
INTERVAL HPI/OVERNIGHT EVENTS:  Patient S&E at bedside. No o/n events,   pt requesting to leave the hospital to take care of stuff at home and then come back for surgery  states that shes waiting to hear from surgical team at this time   no other complaints today     PAST MEDICAL & SURGICAL HISTORY:  Ovarian cancer          FAMILY HISTORY:      VITAL SIGNS:  T(F): 97.9 (04-01-23 @ 08:03)  HR: 76 (04-01-23 @ 08:03)  BP: 146/74 (04-01-23 @ 08:03)  RR: 17 (04-01-23 @ 08:03)  SpO2: 97% (04-01-23 @ 08:03)  Wt(kg): --    PHYSICAL EXAM:  GENERAL: NAD,anxious  HEAD:  Atraumatic,  EYES: EOMI,  CHEST/LUNG: Clear to auscultation bilaterally; No wheeze  HEART: Regular rate and rhythm; No murmurs  ABDOMEN: Soft, Nontender, Nondistended;  EXTREMITIES:  no edema  NEUROLOGY: non-focal  SKIN: right groin 1x1 cm cavity with dressing      MEDICATIONS  (STANDING):  enoxaparin Injectable 40 milliGRAM(s) SubCutaneous <User Schedule>  ondansetron Injectable 4 milliGRAM(s) IV Push once    MEDICATIONS  (PRN):  acetaminophen   IVPB .. 750 milliGRAM(s) IV Intermittent every 6 hours PRN Temp greater or equal to 38C (100.4F), Mild Pain (1 - 3)      Allergies    penicillin (Unknown)    Intolerances        LABS:                        11.7   4.88  )-----------( 325      ( 01 Apr 2023 06:30 )             35.4     04-01    142  |  112<H>  |  14  ----------------------------<  127<H>  3.8   |  25  |  0.63    Ca    8.8      01 Apr 2023 06:30  Phos  3.8     04-01  Mg     2.0     04-01    TPro  6.0  /  Alb  2.7<L>  /  TBili  0.4  /  DBili  x   /  AST  18  /  ALT  20  /  AlkPhos  66  03-31          RADIOLOGY & ADDITIONAL TESTS:  Studies reviewed.

## 2023-04-01 NOTE — PROGRESS NOTE ADULT - ASSESSMENT
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 - She is c/o right groin pain with open, draining, foul smelling purulent drainage    # h/o recurrent serous ovarian cancer   - pt last received doxil/avastin on 1/27/23  - doxil was dose reduced because of poor tolerance   - @ [facility] [place] 2/22 CT C/A/P showed since 2/9 there was slightly decreased b/l inguinal adenopathy with new skin defect in right groin abutting the right inguinal LN  - @ [facility] [place] - 3/15 CT pelvis showed since 2/22/23 slightly increased size of necrotic right inguinal eric metastasis w/ increased fluid component and new foci of air w/ slightly increased soft tissue inflammatory change, unchanged inguinal eric and right external iliac/deep inguinal eric mets, slightly increased cystic lesion with unchanged adjacent solid component in right pelvis, possibly implant.     # right groin abscess  - WBC 7.7, Hb 13.5, plt 383, UA neg - now s/p vancomycin - currently on clindamycin  - CT to r/o abscess- 3.4 x 2.8 x 3 cm rim enhancing fluid collection in the right groin with discontinuity of the surrounding skin suggestive of draining abscess. 3.6 x 3.0 cm left inguinal soft tissue mass suspicious for malignancy. Indeterminant right pelvic sidewall cystic structure measuring 2.6 x 1.8 cm.  - given morphine for pain- currently on tylenol   - Dr. Susan Dr, Karpeh, Dr. Gennaro all on board for surgery on Tuesday for excision of mass along with Vascular and Plastic surgery consults- needs radical dissection of groin and muscle flap  - off antibiotics now   - pt asking if she can be discharged and then return for procedure on tuesday- surgical team to decide on dispo planning and proceeding     [place] [facility] [place]

## 2023-04-01 NOTE — PROGRESS NOTE ADULT - SUBJECTIVE AND OBJECTIVE BOX
Patient was seen and examined at the bedside this morning  No acute events overnight  Pain is better controlled  No nausea or vomiting  Tolerating diet     O/E:  T(C): 36.6 (04-01-23 @ 05:14), Max: 37.2 (03-31-23 @ 21:11)  HR: 57 (04-01-23 @ 05:14) (57 - 76)  BP: 143/57 (04-01-23 @ 05:14) (126/54 - 148/72)  RR: 18 (04-01-23 @ 05:14) (18 - 18)  SpO2: 98% (04-01-23 @ 05:14) (96% - 99%)  Alert, conscious, oriented  No pallor, jaundice or cyanosis  Not in pain or distress  Chest clear, equal air entry bilaterally  Abdomen soft and lax, no tenderness  Extremities: No swelling or tenderness. mass over the right groin                          11.7   4.88  )-----------( 325      ( 01 Apr 2023 06:30 )             35.4   04-01    142  |  112<H>  |  14  ----------------------------<  127<H>  3.8   |  25  |  0.63    Ca    8.8      01 Apr 2023 06:30  Phos  3.8     04-01  Mg     2.0     04-01    TPro  6.0  /  Alb  2.7<L>  /  TBili  0.4  /  DBili  x   /  AST  18  /  ALT  20  /  AlkPhos  66  03-31    MEDICATIONS  (STANDING):  enoxaparin Injectable 40 milliGRAM(s) SubCutaneous <User Schedule>  ondansetron Injectable 4 milliGRAM(s) IV Push once    MEDICATIONS  (PRN):  acetaminophen   IVPB .. 750 milliGRAM(s) IV Intermittent every 6 hours PRN Temp greater or equal to 38C (100.4F), Mild Pain (1 - 3)

## 2023-04-02 LAB
ANION GAP SERPL CALC-SCNC: 5 MMOL/L — SIGNIFICANT CHANGE UP (ref 5–17)
BUN SERPL-MCNC: 13 MG/DL — SIGNIFICANT CHANGE UP (ref 7–23)
CALCIUM SERPL-MCNC: 8.8 MG/DL — SIGNIFICANT CHANGE UP (ref 8.5–10.1)
CHLORIDE SERPL-SCNC: 112 MMOL/L — HIGH (ref 96–108)
CO2 SERPL-SCNC: 23 MMOL/L — SIGNIFICANT CHANGE UP (ref 22–31)
CREAT SERPL-MCNC: 0.61 MG/DL — SIGNIFICANT CHANGE UP (ref 0.5–1.3)
EGFR: 90 ML/MIN/1.73M2 — SIGNIFICANT CHANGE UP
GLUCOSE SERPL-MCNC: 134 MG/DL — HIGH (ref 70–99)
HCT VFR BLD CALC: 35.4 % — SIGNIFICANT CHANGE UP (ref 34.5–45)
HGB BLD-MCNC: 11.7 G/DL — SIGNIFICANT CHANGE UP (ref 11.5–15.5)
MAGNESIUM SERPL-MCNC: 2.1 MG/DL — SIGNIFICANT CHANGE UP (ref 1.6–2.6)
MCHC RBC-ENTMCNC: 29.4 PG — SIGNIFICANT CHANGE UP (ref 27–34)
MCHC RBC-ENTMCNC: 33.1 GM/DL — SIGNIFICANT CHANGE UP (ref 32–36)
MCV RBC AUTO: 88.9 FL — SIGNIFICANT CHANGE UP (ref 80–100)
PHOSPHATE SERPL-MCNC: 3.5 MG/DL — SIGNIFICANT CHANGE UP (ref 2.5–4.5)
PLATELET # BLD AUTO: 321 K/UL — SIGNIFICANT CHANGE UP (ref 150–400)
POTASSIUM SERPL-MCNC: 4.1 MMOL/L — SIGNIFICANT CHANGE UP (ref 3.5–5.3)
POTASSIUM SERPL-SCNC: 4.1 MMOL/L — SIGNIFICANT CHANGE UP (ref 3.5–5.3)
RBC # BLD: 3.98 M/UL — SIGNIFICANT CHANGE UP (ref 3.8–5.2)
RBC # FLD: 13.6 % — SIGNIFICANT CHANGE UP (ref 10.3–14.5)
SODIUM SERPL-SCNC: 140 MMOL/L — SIGNIFICANT CHANGE UP (ref 135–145)
WBC # BLD: 5.51 K/UL — SIGNIFICANT CHANGE UP (ref 3.8–10.5)
WBC # FLD AUTO: 5.51 K/UL — SIGNIFICANT CHANGE UP (ref 3.8–10.5)

## 2023-04-02 NOTE — PROGRESS NOTE ADULT - SUBJECTIVE AND OBJECTIVE BOX
Patient was seen and examined at the bedside this morning  No acute events overnight  Pain is controlled  No nausea or vomiting  Tolerating diet     Vitals:  T(C): 36.4 ( @ 09:23), Max: 36.8 ( @ 15:41)  HR: 60 ( @ :23) (60 - 87)  BP: 109/60 ( @ 09:23) (109/60 - 159/84)  RR: 17 ( @ :23) (17 - 18)  SpO2: 98% ( @ :23) (97% - 100%)      Physical Exam:  General: AAOx3, Well developed, NAD  Chest: Normal respiratory effort  Heart: RRR  Abdomen: Soft, NTND  Neuro/Psych: No localized deficits. Normal spech, normal tone  Skin: Normal, no rashes, no lesions noted.   Extremities: right groin dressing in place, clean and intact    04 @ 07:07                    11.7  CBC: 5.51>)-------(<321                     35.4                 140 | 112 | 13    CMP:  ----------------------< 134               4.1 | 23 | 0.61                      Ca:8.8  Phos:3.5  M.1               -|      |-        LFTs:  ------|-|-----             -|      |-   @ 06:30                    11.7  CBC: 4.88>)-------(<325                     35.4                 142 | 112 | 14    CMP:  ----------------------< 127               3.8 | 25 | 0.63                      Ca:8.8  Phos:3.8  M.0               -|      |-        LFTs:  ------|-|-----             -|      |-      Culture - Blood (collected 23 @ 22:38)  Source: .Blood None  Preliminary Report (23 @ 04:01):    No growth to date.    Culture - Blood (collected 23 @ 22:38)  Source: .Blood None  Preliminary Report (23 @ 04:01):    No growth to date.      Current Inpatient Medications:  acetaminophen   IVPB .. 750 milliGRAM(s) IV Intermittent every 6 hours PRN  enoxaparin Injectable 40 milliGRAM(s) SubCutaneous <User Schedule>  ondansetron Injectable 4 milliGRAM(s) IV Push once

## 2023-04-02 NOTE — PROGRESS NOTE ADULT - ASSESSMENT
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 - She is c/o right groin pain with open, draining, foul smelling purulent drainage    # h/o recurrent serous ovarian cancer   - pt last received doxil/avastin on 1/27/23  - doxil was dose reduced because of poor tolerance   - @ [facility] [place] 2/22 CT C/A/P showed since 2/9 there was slightly decreased b/l inguinal adenopathy with new skin defect in right groin abutting the right inguinal LN  - @ [facility] [place] - 3/15 CT pelvis showed since 2/22/23 slightly increased size of necrotic right inguinal eric metastasis w/ increased fluid component and new foci of air w/ slightly increased soft tissue inflammatory change, unchanged inguinal eric and right external iliac/deep inguinal eric mets, slightly increased cystic lesion with unchanged adjacent solid component in right pelvis, possibly implant.     # right groin abscess  - CT to r/o abscess- 3.4 x 2.8 x 3 cm rim enhancing fluid collection in the right groin with discontinuity of the surrounding skin suggestive of draining abscess. 3.6 x 3.0 cm left inguinal soft tissue mass suspicious for malignancy. Indeterminant right pelvic sidewall cystic structure measuring 2.6 x 1.8 cm.  - Dr. Davis, Massiel Alanis, Dr. Milligan all on board for surgery on Tuesday for excision of mass along with Vascular and Plastic surgery consults- needs radical dissection of groin and muscle flap  - off antibiotics now   - pt asking if she can be discharged and then return for procedure on tuesday- surgical team to decide on dispo planning and proceeding     [place] [facility] [place]

## 2023-04-02 NOTE — PROGRESS NOTE ADULT - SUBJECTIVE AND OBJECTIVE BOX
INTERVAL HPI/OVERNIGHT EVENTS:  Patient S&E at bedside. No o/n events,   pt stayed in hospital- planned for surgery tuesday  states that she did not get much sleep   afebrile, labs reviewed    PAST MEDICAL & SURGICAL HISTORY:  Ovarian cancer          FAMILY HISTORY:      VITAL SIGNS:  T(F): 97.9 (04-02-23 @ 05:15)  HR: 63 (04-02-23 @ 05:15)  BP: 138/62 (04-02-23 @ 05:15)  RR: 18 (04-02-23 @ 05:15)  SpO2: 97% (04-02-23 @ 05:15)  Wt(kg): --    PHYSICAL EXAM:    GENERAL: NAD  CHEST/LUNG: Clear to auscultation bilaterally; No wheeze  HEART: Regular rate and rhythm;   ABDOMEN: Soft, Nontender, Nondistended;  EXTREMITIES:  no edema  NEUROLOGY: non-focal  SKIN: right groin 1x1 cm cavity with dressing      MEDICATIONS  (STANDING):  enoxaparin Injectable 40 milliGRAM(s) SubCutaneous <User Schedule>  ondansetron Injectable 4 milliGRAM(s) IV Push once    MEDICATIONS  (PRN):  acetaminophen   IVPB .. 750 milliGRAM(s) IV Intermittent every 6 hours PRN Temp greater or equal to 38C (100.4F), Mild Pain (1 - 3)      Allergies    penicillin (Unknown)    Intolerances        LABS:                        11.7   4.88  )-----------( 325      ( 01 Apr 2023 06:30 )             35.4     04-01    142  |  112<H>  |  14  ----------------------------<  127<H>  3.8   |  25  |  0.63    Ca    8.8      01 Apr 2023 06:30  Phos  3.8     04-01  Mg     2.0     04-01            RADIOLOGY & ADDITIONAL TESTS:  Studies reviewed.

## 2023-04-02 NOTE — PROGRESS NOTE ADULT - ASSESSMENT
An 82 year old female with abscess of right groin and mass left groin, with remote history of ovarian cancer s/p PITA/BSO    Plan:  cont local wound car for now  OR Tuesday for excision of mass along with Vascular and Plastic surgery consults  cont pain control  Pre op tomorrow  reg diet  pain control  OOBTC   ambulation      Pt seen and examined with Dr Collins

## 2023-04-03 PROBLEM — C56.9 MALIGNANT NEOPLASM OF UNSPECIFIED OVARY: Chronic | Status: ACTIVE | Noted: 2023-03-28

## 2023-04-03 PROBLEM — Z00.00 ENCOUNTER FOR PREVENTIVE HEALTH EXAMINATION: Status: ACTIVE | Noted: 2023-04-03

## 2023-04-03 LAB
ANION GAP SERPL CALC-SCNC: 2 MMOL/L — LOW (ref 5–17)
BUN SERPL-MCNC: 18 MG/DL — SIGNIFICANT CHANGE UP (ref 7–23)
CALCIUM SERPL-MCNC: 8.7 MG/DL — SIGNIFICANT CHANGE UP (ref 8.5–10.1)
CHLORIDE SERPL-SCNC: 112 MMOL/L — HIGH (ref 96–108)
CO2 SERPL-SCNC: 24 MMOL/L — SIGNIFICANT CHANGE UP (ref 22–31)
CREAT SERPL-MCNC: 0.65 MG/DL — SIGNIFICANT CHANGE UP (ref 0.5–1.3)
CULTURE RESULTS: SIGNIFICANT CHANGE UP
CULTURE RESULTS: SIGNIFICANT CHANGE UP
EGFR: 88 ML/MIN/1.73M2 — SIGNIFICANT CHANGE UP
GLUCOSE SERPL-MCNC: 136 MG/DL — HIGH (ref 70–99)
HCT VFR BLD CALC: 36.6 % — SIGNIFICANT CHANGE UP (ref 34.5–45)
HGB BLD-MCNC: 12.2 G/DL — SIGNIFICANT CHANGE UP (ref 11.5–15.5)
MAGNESIUM SERPL-MCNC: 2.1 MG/DL — SIGNIFICANT CHANGE UP (ref 1.6–2.6)
MCHC RBC-ENTMCNC: 29.8 PG — SIGNIFICANT CHANGE UP (ref 27–34)
MCHC RBC-ENTMCNC: 33.3 GM/DL — SIGNIFICANT CHANGE UP (ref 32–36)
MCV RBC AUTO: 89.3 FL — SIGNIFICANT CHANGE UP (ref 80–100)
PHOSPHATE SERPL-MCNC: 3.2 MG/DL — SIGNIFICANT CHANGE UP (ref 2.5–4.5)
PLATELET # BLD AUTO: 298 K/UL — SIGNIFICANT CHANGE UP (ref 150–400)
POTASSIUM SERPL-MCNC: 4.3 MMOL/L — SIGNIFICANT CHANGE UP (ref 3.5–5.3)
POTASSIUM SERPL-SCNC: 4.3 MMOL/L — SIGNIFICANT CHANGE UP (ref 3.5–5.3)
RBC # BLD: 4.1 M/UL — SIGNIFICANT CHANGE UP (ref 3.8–5.2)
RBC # FLD: 13.6 % — SIGNIFICANT CHANGE UP (ref 10.3–14.5)
SARS-COV-2 RNA SPEC QL NAA+PROBE: SIGNIFICANT CHANGE UP
SODIUM SERPL-SCNC: 138 MMOL/L — SIGNIFICANT CHANGE UP (ref 135–145)
SPECIMEN SOURCE: SIGNIFICANT CHANGE UP
SPECIMEN SOURCE: SIGNIFICANT CHANGE UP
WBC # BLD: 5.68 K/UL — SIGNIFICANT CHANGE UP (ref 3.8–10.5)
WBC # FLD AUTO: 5.68 K/UL — SIGNIFICANT CHANGE UP (ref 3.8–10.5)

## 2023-04-03 PROCEDURE — 99232 SBSQ HOSP IP/OBS MODERATE 35: CPT | Mod: 57

## 2023-04-03 RX ORDER — ALPRAZOLAM 0.25 MG
0.25 TABLET ORAL ONCE
Refills: 0 | Status: DISCONTINUED | OUTPATIENT
Start: 2023-04-03 | End: 2023-04-03

## 2023-04-03 RX ADMIN — Medication 0.25 MILLIGRAM(S): at 22:58

## 2023-04-03 NOTE — PROGRESS NOTE ADULT - ASSESSMENT
An 82 year old female with abscess of right groin and mass left groin, with remote history of ovarian cancer s/p PITA/BSO    Plan:  cont local wound car for now  OR Tomorrow for excision of mass along with Vascular and Plastic surgery consults  cont pain control  Pre op today  reg diet  NPO after MN  pre op labs  COVID testing  pain control  OOBTC   ambulation      Pt seen and examined with Dr Davis

## 2023-04-03 NOTE — PROGRESS NOTE ADULT - ASSESSMENT
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 - She is c/o right groin pain with open, draining, foul smelling purulent drainage    # h/o recurrent serous ovarian cancer   - pt last received doxil/avastin on 1/27/23  - doxil was dose reduced because of poor tolerance   - @ [facility] [place] 2/22 CT C/A/P showed since 2/9 there was slightly decreased b/l inguinal adenopathy with new skin defect in right groin abutting the right inguinal LN  - @ [facility] [place] - 3/15 CT pelvis showed since 2/22/23 slightly increased size of necrotic right inguinal eric metastasis w/ increased fluid component and new foci of air w/ slightly increased soft tissue inflammatory change, unchanged inguinal eric and right external iliac/deep inguinal eric mets, slightly increased cystic lesion with unchanged adjacent solid component in right pelvis, possibly implant.     # right groin abscess  - CT to r/o abscess- 3.4 x 2.8 x 3 cm rim enhancing fluid collection in the right groin with discontinuity of the surrounding skin suggestive of draining abscess. 3.6 x 3.0 cm left inguinal soft tissue mass suspicious for malignancy. Indeterminant right pelvic sidewall cystic structure measuring 2.6 x 1.8 cm.  - Dr. Davis, Massiel Alanis, Dr. Milligan all on board for surgery on Tuesday for excision of mass along with Vascular and Plastic surgery consults- needs radical dissection of groin and muscle flap  - off antibiotics now   - NPO AM for surgery tomorrow     discussed with Dr. Clarke at [facility] [place]

## 2023-04-03 NOTE — PROGRESS NOTE ADULT - SUBJECTIVE AND OBJECTIVE BOX
Patient was seen and examined at the bedside this morning  No acute events overnight  Pain is controlled  No nausea or vomiting  Tolerating diet     Physical Exam:  General: AAOx3, Well developed, NAD  Chest: Normal respiratory effort  Heart: RRR  Abdomen: Soft, NTND  Neuro/Psych: No localized deficits. Normal speech normal tone  Skin: Normal, no rashes, no lesions noted.   Extremities: right groin dressing in place, clean and intact           Vitals:  T(C): 36.8 ( @ 08:20), Max: 36.8 ( @ 08:20)  HR: 56 ( @ 08:20) (56 - 97)  BP: 127/60 ( @ 08:20) (127/60 - 157/64)  RR: 18 ( @ 08:20) (18 - 18)  SpO2: 99% ( @ 08:20) (97% - 100%)    04 @ 07:12                    12.2  CBC: 5.68>)-------(<298                     36.6                 138 | 112 | 18    CMP:  ----------------------< 136               4.3 | 24 | 0.65                      Ca:8.7  Phos:3.2  M.1               -|      |-        LFTs:  ------|-|-----             -|      |-  04-02 @ 07:07                    11.7  CBC: 5.51>)-------(<321                     35.4                 140 | 112 | 13    CMP:  ----------------------< 134               4.1 | 23 | 0.61                      Ca:8.8  Phos:3.5  M.1               -|      |-        LFTs:  ------|-|-----             -|      |-      Current Inpatient Medications:  acetaminophen   IVPB .. 750 milliGRAM(s) IV Intermittent every 6 hours PRN  enoxaparin Injectable 40 milliGRAM(s) SubCutaneous <User Schedule>  ondansetron Injectable 4 milliGRAM(s) IV Push once

## 2023-04-03 NOTE — PROGRESS NOTE ADULT - SUBJECTIVE AND OBJECTIVE BOX
INTERVAL HPI/OVERNIGHT EVENTS:  Patient S&E at bedside. No o/n events,   planned for surgery tomorrow   vss on ra   labs reviewed       PAST MEDICAL & SURGICAL HISTORY:  Ovarian cancer          FAMILY HISTORY:      VITAL SIGNS:  T(F): 98.1 (04-03-23 @ 04:00)  HR: 62 (04-03-23 @ 04:00)  BP: 157/64 (04-03-23 @ 04:00)  RR: 18 (04-03-23 @ 04:00)  SpO2: 97% (04-03-23 @ 04:00)  Wt(kg): --    PHYSICAL EXAM:      GENERAL: NAD  CHEST/LUNG: Clear to auscultation bilaterally; No wheeze  HEART: Regular rate and rhythm;   ABDOMEN: Soft, Nontender, Nondistended;  EXTREMITIES:  no edema  NEUROLOGY: non-focal  SKIN: right groin 1x1 cm cavity with dressing      MEDICATIONS  (STANDING):  enoxaparin Injectable 40 milliGRAM(s) SubCutaneous <User Schedule>  ondansetron Injectable 4 milliGRAM(s) IV Push once    MEDICATIONS  (PRN):  acetaminophen   IVPB .. 750 milliGRAM(s) IV Intermittent every 6 hours PRN Temp greater or equal to 38C (100.4F), Mild Pain (1 - 3)      Allergies    penicillin (Unknown)    Intolerances        LABS:                        11.7   5.51  )-----------( 321      ( 02 Apr 2023 07:07 )             35.4     04-02    140  |  112<H>  |  13  ----------------------------<  134<H>  4.1   |  23  |  0.61    Ca    8.8      02 Apr 2023 07:07  Phos  3.5     04-02  Mg     2.1     04-02            RADIOLOGY & ADDITIONAL TESTS:  Studies reviewed.

## 2023-04-04 ENCOUNTER — APPOINTMENT (OUTPATIENT)
Dept: SURGICAL ONCOLOGY | Facility: HOSPITAL | Age: 83
End: 2023-04-04

## 2023-04-04 ENCOUNTER — RESULT REVIEW (OUTPATIENT)
Age: 83
End: 2023-04-04

## 2023-04-04 ENCOUNTER — TRANSCRIPTION ENCOUNTER (OUTPATIENT)
Age: 83
End: 2023-04-04

## 2023-04-04 LAB
ABO RH CONFIRMATION: SIGNIFICANT CHANGE UP
ANION GAP SERPL CALC-SCNC: 6 MMOL/L — SIGNIFICANT CHANGE UP (ref 5–17)
BUN SERPL-MCNC: 14 MG/DL — SIGNIFICANT CHANGE UP (ref 7–23)
CALCIUM SERPL-MCNC: 8.9 MG/DL — SIGNIFICANT CHANGE UP (ref 8.5–10.1)
CHLORIDE SERPL-SCNC: 110 MMOL/L — HIGH (ref 96–108)
CO2 SERPL-SCNC: 24 MMOL/L — SIGNIFICANT CHANGE UP (ref 22–31)
CREAT SERPL-MCNC: 0.62 MG/DL — SIGNIFICANT CHANGE UP (ref 0.5–1.3)
EGFR: 89 ML/MIN/1.73M2 — SIGNIFICANT CHANGE UP
GLUCOSE SERPL-MCNC: 145 MG/DL — HIGH (ref 70–99)
HCT VFR BLD CALC: 35.1 % — SIGNIFICANT CHANGE UP (ref 34.5–45)
HGB BLD-MCNC: 11.7 G/DL — SIGNIFICANT CHANGE UP (ref 11.5–15.5)
INR BLD: 1.04 RATIO — SIGNIFICANT CHANGE UP (ref 0.88–1.16)
MAGNESIUM SERPL-MCNC: 2 MG/DL — SIGNIFICANT CHANGE UP (ref 1.6–2.6)
MCHC RBC-ENTMCNC: 29.6 PG — SIGNIFICANT CHANGE UP (ref 27–34)
MCHC RBC-ENTMCNC: 33.3 GM/DL — SIGNIFICANT CHANGE UP (ref 32–36)
MCV RBC AUTO: 88.9 FL — SIGNIFICANT CHANGE UP (ref 80–100)
PHOSPHATE SERPL-MCNC: 3.8 MG/DL — SIGNIFICANT CHANGE UP (ref 2.5–4.5)
PLATELET # BLD AUTO: 325 K/UL — SIGNIFICANT CHANGE UP (ref 150–400)
POTASSIUM SERPL-MCNC: 4.2 MMOL/L — SIGNIFICANT CHANGE UP (ref 3.5–5.3)
POTASSIUM SERPL-SCNC: 4.2 MMOL/L — SIGNIFICANT CHANGE UP (ref 3.5–5.3)
PROTHROM AB SERPL-ACNC: 12.1 SEC — SIGNIFICANT CHANGE UP (ref 10.5–13.4)
RBC # BLD: 3.95 M/UL — SIGNIFICANT CHANGE UP (ref 3.8–5.2)
RBC # FLD: 13.6 % — SIGNIFICANT CHANGE UP (ref 10.3–14.5)
SODIUM SERPL-SCNC: 140 MMOL/L — SIGNIFICANT CHANGE UP (ref 135–145)
WBC # BLD: 5.84 K/UL — SIGNIFICANT CHANGE UP (ref 3.8–10.5)
WBC # FLD AUTO: 5.84 K/UL — SIGNIFICANT CHANGE UP (ref 3.8–10.5)

## 2023-04-04 PROCEDURE — 38765 REMOVE GROIN LYMPH NODES: CPT | Mod: RT

## 2023-04-04 PROCEDURE — 35860 EXPLORE LIMB VESSELS: CPT

## 2023-04-04 PROCEDURE — 88307 TISSUE EXAM BY PATHOLOGIST: CPT | Mod: 26

## 2023-04-04 PROCEDURE — 38500 BIOPSY/REMOVAL LYMPH NODES: CPT | Mod: 59,LT

## 2023-04-04 PROCEDURE — 88305 TISSUE EXAM BY PATHOLOGIST: CPT | Mod: 26

## 2023-04-04 PROCEDURE — 88360 TUMOR IMMUNOHISTOCHEM/MANUAL: CPT | Mod: 26

## 2023-04-04 RX ORDER — CEFAZOLIN SODIUM 1 G
VIAL (EA) INJECTION
Refills: 0 | Status: DISCONTINUED | OUTPATIENT
Start: 2023-04-04 | End: 2023-04-05

## 2023-04-04 RX ORDER — FENTANYL CITRATE 50 UG/ML
50 INJECTION INTRAVENOUS
Refills: 0 | Status: DISCONTINUED | OUTPATIENT
Start: 2023-04-04 | End: 2023-04-04

## 2023-04-04 RX ORDER — HYDROMORPHONE HYDROCHLORIDE 2 MG/ML
0.5 INJECTION INTRAMUSCULAR; INTRAVENOUS; SUBCUTANEOUS
Refills: 0 | Status: DISCONTINUED | OUTPATIENT
Start: 2023-04-04 | End: 2023-04-04

## 2023-04-04 RX ORDER — SODIUM CHLORIDE 9 MG/ML
1000 INJECTION, SOLUTION INTRAVENOUS
Refills: 0 | Status: DISCONTINUED | OUTPATIENT
Start: 2023-04-04 | End: 2023-04-05

## 2023-04-04 RX ORDER — CEFAZOLIN SODIUM 1 G
500 VIAL (EA) INJECTION ONCE
Refills: 0 | Status: COMPLETED | OUTPATIENT
Start: 2023-04-04 | End: 2023-04-04

## 2023-04-04 RX ORDER — CEFAZOLIN SODIUM 1 G
500 VIAL (EA) INJECTION EVERY 8 HOURS
Refills: 0 | Status: DISCONTINUED | OUTPATIENT
Start: 2023-04-05 | End: 2023-04-05

## 2023-04-04 RX ORDER — CEFAZOLIN SODIUM 1 G
VIAL (EA) INJECTION
Refills: 0 | Status: DISCONTINUED | OUTPATIENT
Start: 2023-04-04 | End: 2023-04-04

## 2023-04-04 RX ORDER — MORPHINE SULFATE 50 MG/1
4 CAPSULE, EXTENDED RELEASE ORAL EVERY 6 HOURS
Refills: 0 | Status: DISCONTINUED | OUTPATIENT
Start: 2023-04-04 | End: 2023-04-05

## 2023-04-04 RX ADMIN — SODIUM CHLORIDE 75 MILLILITER(S): 9 INJECTION, SOLUTION INTRAVENOUS at 05:44

## 2023-04-04 RX ADMIN — Medication 500 MILLIGRAM(S): at 20:34

## 2023-04-04 RX ADMIN — SODIUM CHLORIDE 75 MILLILITER(S): 9 INJECTION, SOLUTION INTRAVENOUS at 20:37

## 2023-04-04 NOTE — CONSULT NOTE ADULT - SUBJECTIVE AND OBJECTIVE BOX
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 Dr Clarke for drainage of a right groin abscess.     Pt was diagnosed with Ovarian Ca 13 yrs ago for which she was had hysterectomy with BSO at [facility] 13 yrs ago; pt does not remember the name of Surgeon. Pt also underwent chemotherapy and last completed a cycle about 1/27/23.     About 2 months ago, pt stated that she noticed a mass in her right groin. Pt stated she has had multiple tests done on her and believes she had a biopsy of the left groin. The area started to drain purulent fluid in weeks to come then evolved into a open wound for which she needed wound care to com to the house. Pt stated that Dr Clarke sent her for a CT pelvis and said that they could not drain it in the office and sent her here.  Pt currently alone due to the sudden death of her  7 yrs ago and then the passing of her daughter 7 months after that due to cancer.    She is c/o right groin pain   - WBC 7.7, Hb 13.5, plt 383, UA neg - now s/p vancomycin - CT to r/o abscess- 3.4 x 2.8 x 3 cm rim enhancing fluid collection in the right groin with discontinuity of the surrounding skin suggestive of draining abscess. 3.6 x 3.0 cm left inguinal soft tissue mass suspicious for malignancy. Indeterminant right pelvic sidewall cystic structure measuring 2.6 x 1.8 cm.  - given morphine for pain     PmHx: Pre-Diabetes, Ovarian Cancer, Depression  PsHx: PITA/BSO (13 yrs ago at [facility])  All: PCN         (29 Mar 2023 10:06)      PAST MEDICAL & SURGICAL HISTORY:  Ovarian cancer          Allergies    penicillin (Unknown)    Intolerances        MEDICATIONS  (STANDING):  clindamycin IVPB 600 milliGRAM(s) IV Intermittent every 8 hours  enoxaparin Injectable 40 milliGRAM(s) SubCutaneous <User Schedule>  ondansetron Injectable 4 milliGRAM(s) IV Push once    MEDICATIONS  (PRN):  acetaminophen   IVPB .. 750 milliGRAM(s) IV Intermittent every 6 hours PRN Temp greater or equal to 38C (100.4F), Mild Pain (1 - 3)      FAMILY HISTORY:      SOCIAL HISTORY: No EtOH, no tobacco    REVIEW OF SYSTEMS:    CONSTITUTIONAL: No weakness, fevers or chills  EYES/ENT: No visual changes;  No vertigo or throat pain   NECK: No pain or stiffness  RESPIRATORY: No cough, wheezing, hemoptysis; No shortness of breath  CARDIOVASCULAR: No chest pain or palpitations  GASTROINTESTINAL: No abdominal or epigastric pain. No nausea, vomiting, or hematemesis; No diarrhea or constipation. No melena or hematochezia.  GENITOURINARY: No dysuria, frequency or hematuria  NEUROLOGICAL: No numbness or weakness  SKIN: +purulent drainage from right groin   All other review of systems is negative unless indicated above.      Weight (kg): 56.5 (03-29 @ 15:13)    T(F): 97.3 (03-30-23 @ 05:01), Max: 98.6 (03-29-23 @ 15:13)  HR: 70 (03-30-23 @ 05:01)  BP: 148/70 (03-30-23 @ 05:01)  RR: 18 (03-30-23 @ 05:01)  SpO2: 99% (03-30-23 @ 05:01)  Wt(kg): --    GENERAL: NAD,  HEAD:  Atraumatic,  EYES: EOMI,  CHEST/LUNG: Clear to auscultation bilaterally; No wheeze  HEART: Regular rate and rhythm; No murmurs  ABDOMEN: Soft, Nontender, Nondistended;  EXTREMITIES:  no edema  NEUROLOGY: non-focal  SKIN: right groin 1x1 cm cavity with pus coming from groin                           13.5   7.77  )-----------( 383      ( 28 Mar 2023 22:38 )             40.1       03-28    139  |  107  |  14  ----------------------------<  168<H>  3.9   |  27  |  0.83    Ca    10.3<H>      28 Mar 2023 22:38    TPro  7.7  /  Alb  3.6  /  TBili  0.6  /  DBili  x   /  AST  20  /  ALT  19  /  AlkPhos  84  03-28              .Blood None  03-28 @ 22:38   No growth to date.  --  --      
Pt is a 82y Female with history of Ovarain cancer s/p LYNN/KATHIE 13 years ago. Presenting with necrotic lymph nodes, and history of radiation. Will undergo Radical groin dissection of right groin and will need tissue coverage    No other injuries.    PmHx: Pre-Diabetes, Ovarian Cancer, Depression  PsHx: PITA/BSO (13 yrs ago at [facility])  All: PCN            ROS:  - CONSTITUTIONAL: Denies weight loss, fever and chills.  - HEENT: Denies changes in vision and hearing.  - RESPIRATORY: Denies SOB and cough.  - CV: Denies palpitations and CP.  - GI: Denies abdominal pain, nausea, vomiting and diarrhea.  - : Denies dysuria and urinary frequency.  - MSK: Denies myalgia and joint pain.  - SKIN: Denies rash and pruritus.  - NEUROLOGICAL: Denies headache and syncope.  - PSYCHIATRIC: Denies recent changes in mood. Denies anxiety and depression.    T(C): 36.5 (04-04-23 @ 08:43), Max: 36.8 (04-03-23 @ 21:06)  HR: 61 (04-04-23 @ 08:43) (61 - 81)  BP: 122/54 (04-04-23 @ 08:43) (122/54 - 145/65)  RR: 18 (04-04-23 @ 08:43) (18 - 18)  SpO2: 98% (04-04-23 @ 08:43) (97% - 98%)      PE: Gen- NAD  HEENT- EOMI  CVS- RRR  Chest- Equal Chest Expansion B/L  Abd- Soft NT, ND  Ext- FROMx4         Focused: Right groin with necrotic mass             
Patient is a 82y old  Female who presents with a chief complaint of     BRIEF HOSPITAL COURSE: Pt is a 82 F, with a medical history listed below, was told to come to the hospital by her physician, Dr Clarke for possible drainage of a right groin abscess. Pt was diagnosed with Ovarian Ca 13 yrs ago for which she was had hysterectomy with BSO at [facility] 13 yrs ago; pt does not remember the name of Surgeon. Pt also underwent chemotherapy and last completed a cycle about one month ago. About 2 months ago, pt stated that she noticed a mass in her right groin. Pt stated she has had multiple tests done on her and believes she had a biopsy of the left groin. The area started to drain purulent fluid in weeks to come then evolved into a open wound for which she needed wound care to com to the house. Pt stated that Dr Clarke sent her for a CT pelvis and said that they could not drain it in the office and sent her here.  Pt currently alone due to the sudden death of her  7 yrs ago and then the passing of her daughter 7 months after that due to cancer.    4/4: Pt s/p Dissection of right inguinal region with biopsy of lymph node and removal of groin abscess and Excision of left inguinal lymph node, admitted to SICU for post op management    PAST MEDICAL & SURGICAL HISTORY:  Ovarian cancer        Allergies    penicillin (Unknown)    Intolerances      FAMILY HISTORY:    SOCIAL HISTORY:     Review of Systems:  CONSTITUTIONAL: No fever, chills, or fatigue.  EYES: No eye pain, visual disturbances, or discharge.  ENMT:  No difficulty hearing, tinnitus, or vertigo. No sinus or throat pain.  NECK: No pain or stiffness.  RESPIRATORY: No shortness of breath, cough, or wheezing.  CARDIOVASCULAR: No chest pain, palpitations, dizziness, or leg swelling.  GASTROINTESTINAL: No abdominal or epigastric pain. No nausea, vomiting,  diarrhea, or constipation. No hematemesis, melena, or hematochezia.  GENITOURINARY: No dysuria, frequency, hematuria, or incontinence.  NEUROLOGICAL: No headaches, memory loss, loss of strength, numbness, or tremors.  SKIN: No itching, burning, rashes, or lesions.  MUSCULOSKELETAL: No joint pain or swelling; No muscle, back, or extremity pain.  PSYCHIATRIC: No depression, anxiety, mood swings, or difficulty sleeping.    Medications:  ceFAZolin  Injectable.            acetaminophen   IVPB .. 750 milliGRAM(s) IV Intermittent every 6 hours PRN  morphine  - Injectable 4 milliGRAM(s) IV Push every 6 hours PRN  ondansetron Injectable 4 milliGRAM(s) IV Push once      enoxaparin Injectable 40 milliGRAM(s) SubCutaneous <User Schedule>          lactated ringers. 1000 milliLiter(s) IV Continuous <Continuous>                ICU Vital Signs Last 24 Hrs  T(C): 36.1 (04 Apr 2023 18:47), Max: 36.5 (04 Apr 2023 08:43)  T(F): 97 (04 Apr 2023 18:47), Max: 97.7 (04 Apr 2023 08:43)  HR: 68 (04 Apr 2023 21:30) (61 - 68)  BP: 132/57 (04 Apr 2023 21:00) (122/50 - 133/55)  BP(mean): --  ABP: 135/55 (04 Apr 2023 21:30) (127/54 - 146/56)  ABP(mean): --  RR: 13 (04 Apr 2023 21:30) (10 - 18)  SpO2: 100% (04 Apr 2023 21:30) (96% - 100%)    O2 Parameters below as of 04 Apr 2023 21:30  Patient On (Oxygen Delivery Method): nasal cannula  O2 Flow (L/min): 2        Vital Signs Last 24 Hrs  T(C): 36.1 (04 Apr 2023 18:47), Max: 36.5 (04 Apr 2023 08:43)  T(F): 97 (04 Apr 2023 18:47), Max: 97.7 (04 Apr 2023 08:43)  HR: 68 (04 Apr 2023 21:30) (61 - 68)  BP: 132/57 (04 Apr 2023 21:00) (122/50 - 133/55)  BP(mean): --  RR: 13 (04 Apr 2023 21:30) (10 - 18)  SpO2: 100% (04 Apr 2023 21:30) (96% - 100%)    Parameters below as of 04 Apr 2023 21:30  Patient On (Oxygen Delivery Method): nasal cannula  O2 Flow (L/min): 2          I&O's Detail    04 Apr 2023 07:01  -  04 Apr 2023 23:09  --------------------------------------------------------  IN:    Lactated Ringers: 150 mL    Other (mL): 1600 mL  Total IN: 1750 mL    OUT:    Blood Loss (mL): 100 mL    Bulb (mL): 10 mL    Bulb (mL): 35 mL    Bulb (mL): 15 mL    Indwelling Catheter - Urethral (mL): 160 mL    Other (mL): 550 mL  Total OUT: 870 mL    Total NET: 880 mL          LABS:                        11.7   5.84  )-----------( 325      ( 04 Apr 2023 06:45 )             35.1     04-04    140  |  110<H>  |  14  ----------------------------<  145<H>  4.2   |  24  |  0.62    Ca    8.9      04 Apr 2023 05:31  Phos  3.8     04-04  Mg     2.0     04-04            CAPILLARY BLOOD GLUCOSE        PT/INR - ( 04 Apr 2023 06:45 )   PT: 12.1 sec;   INR: 1.04 ratio               CULTURES:      Physical Examination:      General: Well appearing, lying in bed in NAD.    HEENT: Pupils equal, reactive to light. Symmetric. No scleral icterus or injection.    PULM: Clear to auscultation B/L. No wheezes, rales, or rhonchi appreciated. No significant sputum production or increased respiratory effort.    NECK: Supple, no lymphadenopathy, trachea midline.    CVS: Regular rate and rhythm, no murmurs appreciated, +s1/s2.    ABD: Soft, nondistended, nontender, normoactive bowel sounds.    EXT: No edema, nontender. Bilateral groin sites CDI, + peripheral pulses    SKIN: Warm and well perfused, no rashes noted.    NEURO: Alert, oriented, interactive, nonfocal    RADIOLOGY: < from: CT Abdomen and Pelvis w/ IV Cont (03.29.23 @ 06:05) >    ACC: [id number] EXAM:  CT ABDOMEN AND PELVIS IC   ORDERED BY: LEANNE CAMILO     PROCEDURE DATE:  03/29/2023          INTERPRETATION:  FINAL REPORT:    CLINICAL INFORMATION: 82 years  Female with right groin abscess.    COMPARISON: None.    CONTRAST/COMPLICATIONS:  IV Contrast: Omnipaque 350  90 cc administered   10 cc discarded  Oral Contrast: NONE  Complications: None reported at time of study completion    PROCEDURE:  CT of the Abdomen and Pelvis was performed.  Sagittal and coronal reformats were performed.    FINDINGS:  LOWER CHEST: Mild bibasilar dependent atelectasis.    LIVER: 1.3 cm cyst in the inferior right lobe.  BILE DUCTS: Normal caliber.  GALLBLADDER: Within normal limits.  SPLEEN: Within normal limits.  PANCREAS: Within normal limits.  ADRENALS: Within normal limits.  KIDNEYS/URETERS: Within normal limits.    BLADDER: Within normal limits.  REPRODUCTIVE ORGANS: Hysterectomy.    BOWEL: No bowel obstruction. Appendix is normal.. Mild colonic stool   burden. The stomach is decompressed and cannot be assessed.  PERITONEUM: No ascites. 2.6 x 1.8 cm right pelvic sidewall cystic   structure (2:69).  VESSELS: Atherosclerotic changes.  RETROPERITONEUM/LYMPH NODES: 7 x 4 mm left external iliac lymph node   (2:79).  ABDOMINAL WALL: 3.6 x 3.0 cm left inguinal soft tissue mass. 3.4 x 2.8 x   3 cm rim enhancing fluid collection in the right  groin with discontinuity of the surrounding skin suggestive of draining   abscess.  Postoperative changes.  BONES: Moderate degenerative changes. Levoscoliosis.    IMPRESSION:  3.4 x 2.8 x 3 cm rim enhancing fluid collection in the right groin with  discontinuity of the surrounding skin suggestive of draining abscess.    3.6 x 3.0 cm left inguinal soft tissue mass suspicious for malignancy.    Indeterminant right pelvic sidewall cystic structure measuring 2.6 x 1.8   cm.        A preliminary report was provided by Dr. Jesenia Palacio of [facility].    --- End of Report ---            LETICIA CRAVEN MD; Attending Radiologist  This document has been electronically signed. Mar 29 2023 10:03AM    < end of copied text >

## 2023-04-04 NOTE — BRIEF OPERATIVE NOTE - NSICDXBRIEFPOSTOP_GEN_ALL_CORE_FT
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

## 2023-04-04 NOTE — CONSULT NOTE ADULT - ASSESSMENT
82yFemale w/ hx of Ovarian cancer s/p LYNN/ 13 years ago, now with necrotic lymph nodes, and history of radiation. Will undergo Radical groin dissection of right groin and will need tissue coverage    -will assist in OR for wound coverage likely Pedicled muscle flap from thigh possible myocutaneous or muscle and skin graft.  -dicsussed the risk and benefits with the patient and son, informed consent obtained all questions answered    -Over 45 minutes was spent on this evaluation. Time included previsit evaluation of medical record, face-to-face time, counseling the patient, and family, and post-visit documentation and coordination of care.

## 2023-04-04 NOTE — PROGRESS NOTE ADULT - ASSESSMENT
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 - She is c/o right groin pain with open, draining, foul smelling purulent drainage    # h/o recurrent serous ovarian cancer   - pt last received doxil/avastin on 1/27/23  - doxil was dose reduced because of poor tolerance   - [place] [facility] [place] C/A/P showed since 2/9 there was slightly decreased b/l inguinal adenopathy with new skin defect in right groin abutting the right inguinal LN  - [place] [facility] [place] pelvis showed since 2/22/23 slightly increased size of necrotic right inguinal eric metastasis w/ increased fluid component and new foci of air w/ slightly increased soft tissue inflammatory change, unchanged inguinal eric and right external iliac/deep inguinal eric mets, slightly increased cystic lesion with unchanged adjacent solid component in right pelvis, possibly implant.   - will need to restart outpt     # right groin abscess  - CT to r/o abscess- 3.4 x 2.8 x 3 cm rim enhancing fluid collection in the right groin with discontinuity of the surrounding skin suggestive of draining abscess. 3.6 x 3.0 cm left inguinal soft tissue mass suspicious for malignancy. Indeterminant right pelvic sidewall cystic structure measuring 2.6 x 1.8 cm.  - Dr. Davis, Massiel Alanis, Dr. Milligan all on board for surgery TODAY AT 2 PM for excision of mass along with Vascular and Plastic surgery consults- needs radical dissection of groin and muscle flap  - off antibiotics now   - NPO    will f/u with surgery team thereafter   discussed with Dr. Clarke at [facility]

## 2023-04-04 NOTE — PROGRESS NOTE ADULT - ASSESSMENT
An 82 year old female with abscess of right groin and mass left groin, with remote history of ovarian cancer s/p PITA/BSO    Plan:    OR Today for excision of mass along with Vascular and Plastic surgery consults  cont pain control  NPO  reg diet after O  pain control  OOBTC   ambulation    Pt seen and examined with Dr Rankin

## 2023-04-04 NOTE — PROVIDER CONTACT NOTE (EICU) - SITUATION
82 F, with a medical history listed below, was told to come to the hospital by her physician, Dr Clarke for possible drainage of a right groin abscess. Pt was diagnosed with Ovarian Ca 13 yrs ago for which she was had hysterectomy with BSO at [facility] 13 yrs ago; pt does not remember the name of Surgeon. Pt also underwent chemotherapy and last completed a cycle about one month ago. About 2 months ago, pt stated that she noticed a mass in her right groin. Pt stated she has had multiple tests done on her and believes she had a biopsy of the left groin.  Case discussed with the ICU PA s/p right groin dissection.

## 2023-04-04 NOTE — PROGRESS NOTE ADULT - SUBJECTIVE AND OBJECTIVE BOX
INTERVAL HPI/OVERNIGHT EVENTS:  Patient S&E at bedside. No o/n events,   planned surgery today   pt anxious     PAST MEDICAL & SURGICAL HISTORY:  Ovarian cancer          FAMILY HISTORY:      VITAL SIGNS:  T(F): 98.3 (04-03-23 @ 21:06)  HR: 81 (04-03-23 @ 21:06)  BP: 145/65 (04-03-23 @ 21:06)  RR: 18 (04-03-23 @ 21:06)  SpO2: 97% (04-03-23 @ 21:06)  Wt(kg): --    PHYSICAL EXAM:      GENERAL: NAD  CHEST/LUNG: Clear to auscultation bilaterally; No wheeze  HEART: Regular rate and rhythm;   ABDOMEN: Soft, Nontender, Nondistended;  EXTREMITIES:  no edema  NEUROLOGY: non-focal  SKIN: right groin 1x1 cm cavity with dressing      MEDICATIONS  (STANDING):  enoxaparin Injectable 40 milliGRAM(s) SubCutaneous <User Schedule>  lactated ringers. 1000 milliLiter(s) (75 mL/Hr) IV Continuous <Continuous>  ondansetron Injectable 4 milliGRAM(s) IV Push once    MEDICATIONS  (PRN):  acetaminophen   IVPB .. 750 milliGRAM(s) IV Intermittent every 6 hours PRN Temp greater or equal to 38C (100.4F), Mild Pain (1 - 3)      Allergies    penicillin (Unknown)    Intolerances        LABS:                        11.7   5.84  )-----------( 325      ( 04 Apr 2023 06:45 )             35.1     04-04    140  |  110<H>  |  14  ----------------------------<  145<H>  4.2   |  24  |  0.62    Ca    8.9      04 Apr 2023 05:31  Phos  3.8     04-04  Mg     2.0     04-04      PT/INR - ( 04 Apr 2023 06:45 )   PT: 12.1 sec;   INR: 1.04 ratio               RADIOLOGY & ADDITIONAL TESTS:  Studies reviewed.

## 2023-04-04 NOTE — BRIEF OPERATIVE NOTE - NSICDXBRIEFPREOP_GEN_ALL_CORE_FT
PRE-OP DIAGNOSIS:  Groin abscess 04-Apr-2023 19:00:04  Angeles Drew  
PRE-OP DIAGNOSIS:  Right groin wound 04-Apr-2023 19:01:03  Fernando Johnston

## 2023-04-04 NOTE — PROGRESS NOTE ADULT - SUBJECTIVE AND OBJECTIVE BOX
Patient was seen and examined at the bedside this morning  No acute events overnight  Pain is controlled  No nausea or vomiting  Tolerating diet     Physical Exam:  General: AAOx3, Well developed, NAD  Chest: Normal respiratory effort  Heart: RRR  Abdomen: Soft, NTND  Neuro/Psych: No localized deficits. Normal speech normal tone  Skin: Normal, no rashes, no lesions noted.   Extremities: right groin dressing in place, clean and intact           Vitals:  T(C): 36.5 ( @ 08:43), Max: 36.8 ( @ 21:06)  HR: 61 ( @ 08:43) (61 - 81)  BP: 122/54 ( @ 08:43) (122/54 - 145/65)  RR: 18 ( @ 08:43) (18 - 18)  SpO2: 98% ( @ 08:43) (97% - 98%)    04 @ 06:45                    11.7  CBC: 5.84>)-------(<325                     35.1                 - | - | -    CMP:  ----------------------< -               - | - | -                      Ca:-  Phos:-  Mg:-               -|      |-        LFTs:  ------|-|-----             -|      |-  04-04 @ 05:31                    -  CBC: ->)-------(<-                     -                 140 | 110 | 14    CMP:  ----------------------< 145               4.2 | 24 | 0.62                      Ca:8.9  Phos:3.8  M.0               -|      |-        LFTs:  ------|-|-----             -|      |-  -03 @ 07:12                    12.2  CBC: 5.68>)-------(<298                     36.6                 138 | 112 | 18    CMP:  ----------------------< 136               4.3 | 24 | 0.65                      Ca:8.7  Phos:3.2  M.1               -|      |-        LFTs:  ------|-|-----             -|      |-      Current Inpatient Medications:  acetaminophen   IVPB .. 750 milliGRAM(s) IV Intermittent every 6 hours PRN  enoxaparin Injectable 40 milliGRAM(s) SubCutaneous <User Schedule>  lactated ringers. 1000 milliLiter(s) (75 mL/Hr) IV Continuous <Continuous>  ondansetron Injectable 4 milliGRAM(s) IV Push once

## 2023-04-04 NOTE — BRIEF OPERATIVE NOTE - OPERATION/FINDINGS
Right inguinal necrotic lymph nodes overlying femoral vessels  Right inguinal region excised with plastics gracilis flap reconstruction   Left sided necrotic lymph node excision
Pedicled right gracilis flap, Complex closure of groin over muscle flap 18cm

## 2023-04-04 NOTE — BRIEF OPERATIVE NOTE - NSICDXBRIEFPROCEDURE_GEN_ALL_CORE_FT
PROCEDURES:  Dissection of right inguinal region with biopsy of lymph node 04-Apr-2023 18:59:13  Angeles Drew  Excision of left inguinal lymph node 04-Apr-2023 18:59:24  Angeles Drew  Open excision of lymph node of right lower extremity 04-Apr-2023 18:59:47  Angeles Drew  
PROCEDURES:  Myocutaneous flap, trunk 04-Apr-2023 19:01:15  Fernando Johnston  Closure, surgical wound or dehiscence, abdomen, extensive or complex, secondary 04-Apr-2023 19:02:22  Fernando Johnston

## 2023-04-05 LAB
ALBUMIN SERPL ELPH-MCNC: 2.6 G/DL — LOW (ref 3.3–5)
ALP SERPL-CCNC: 66 U/L — SIGNIFICANT CHANGE UP (ref 40–120)
ALT FLD-CCNC: 16 U/L — SIGNIFICANT CHANGE UP (ref 12–78)
ANION GAP SERPL CALC-SCNC: 3 MMOL/L — LOW (ref 5–17)
ANION GAP SERPL CALC-SCNC: 4 MMOL/L — LOW (ref 5–17)
AST SERPL-CCNC: 18 U/L — SIGNIFICANT CHANGE UP (ref 15–37)
BASOPHILS # BLD AUTO: 0.03 K/UL — SIGNIFICANT CHANGE UP (ref 0–0.2)
BASOPHILS NFR BLD AUTO: 0.3 % — SIGNIFICANT CHANGE UP (ref 0–2)
BILIRUB SERPL-MCNC: 0.4 MG/DL — SIGNIFICANT CHANGE UP (ref 0.2–1.2)
BUN SERPL-MCNC: 11 MG/DL — SIGNIFICANT CHANGE UP (ref 7–23)
BUN SERPL-MCNC: 12 MG/DL — SIGNIFICANT CHANGE UP (ref 7–23)
CALCIUM SERPL-MCNC: 8.3 MG/DL — LOW (ref 8.5–10.1)
CALCIUM SERPL-MCNC: 8.4 MG/DL — LOW (ref 8.5–10.1)
CHLORIDE SERPL-SCNC: 109 MMOL/L — HIGH (ref 96–108)
CHLORIDE SERPL-SCNC: 110 MMOL/L — HIGH (ref 96–108)
CO2 SERPL-SCNC: 25 MMOL/L — SIGNIFICANT CHANGE UP (ref 22–31)
CO2 SERPL-SCNC: 25 MMOL/L — SIGNIFICANT CHANGE UP (ref 22–31)
CREAT SERPL-MCNC: 0.5 MG/DL — SIGNIFICANT CHANGE UP (ref 0.5–1.3)
CREAT SERPL-MCNC: 0.59 MG/DL — SIGNIFICANT CHANGE UP (ref 0.5–1.3)
EGFR: 90 ML/MIN/1.73M2 — SIGNIFICANT CHANGE UP
EGFR: 94 ML/MIN/1.73M2 — SIGNIFICANT CHANGE UP
EOSINOPHIL # BLD AUTO: 0 K/UL — SIGNIFICANT CHANGE UP (ref 0–0.5)
EOSINOPHIL NFR BLD AUTO: 0 % — SIGNIFICANT CHANGE UP (ref 0–6)
GLUCOSE SERPL-MCNC: 165 MG/DL — HIGH (ref 70–99)
GLUCOSE SERPL-MCNC: 199 MG/DL — HIGH (ref 70–99)
HCT VFR BLD CALC: 32.9 % — LOW (ref 34.5–45)
HCT VFR BLD CALC: 33.8 % — LOW (ref 34.5–45)
HGB BLD-MCNC: 11.2 G/DL — LOW (ref 11.5–15.5)
HGB BLD-MCNC: 11.3 G/DL — LOW (ref 11.5–15.5)
IMM GRANULOCYTES NFR BLD AUTO: 0.2 % — SIGNIFICANT CHANGE UP (ref 0–0.9)
LYMPHOCYTES # BLD AUTO: 0.9 K/UL — LOW (ref 1–3.3)
LYMPHOCYTES # BLD AUTO: 10.2 % — LOW (ref 13–44)
MAGNESIUM SERPL-MCNC: 1.9 MG/DL — SIGNIFICANT CHANGE UP (ref 1.6–2.6)
MAGNESIUM SERPL-MCNC: 1.9 MG/DL — SIGNIFICANT CHANGE UP (ref 1.6–2.6)
MCHC RBC-ENTMCNC: 29.7 PG — SIGNIFICANT CHANGE UP (ref 27–34)
MCHC RBC-ENTMCNC: 30.1 PG — SIGNIFICANT CHANGE UP (ref 27–34)
MCHC RBC-ENTMCNC: 33.4 GM/DL — SIGNIFICANT CHANGE UP (ref 32–36)
MCHC RBC-ENTMCNC: 34 GM/DL — SIGNIFICANT CHANGE UP (ref 32–36)
MCV RBC AUTO: 88.4 FL — SIGNIFICANT CHANGE UP (ref 80–100)
MCV RBC AUTO: 88.7 FL — SIGNIFICANT CHANGE UP (ref 80–100)
MONOCYTES # BLD AUTO: 0.73 K/UL — SIGNIFICANT CHANGE UP (ref 0–0.9)
MONOCYTES NFR BLD AUTO: 8.3 % — SIGNIFICANT CHANGE UP (ref 2–14)
NEUTROPHILS # BLD AUTO: 7.14 K/UL — SIGNIFICANT CHANGE UP (ref 1.8–7.4)
NEUTROPHILS NFR BLD AUTO: 81 % — HIGH (ref 43–77)
PHOSPHATE SERPL-MCNC: 3.9 MG/DL — SIGNIFICANT CHANGE UP (ref 2.5–4.5)
PHOSPHATE SERPL-MCNC: 3.9 MG/DL — SIGNIFICANT CHANGE UP (ref 2.5–4.5)
PLATELET # BLD AUTO: 283 K/UL — SIGNIFICANT CHANGE UP (ref 150–400)
PLATELET # BLD AUTO: 290 K/UL — SIGNIFICANT CHANGE UP (ref 150–400)
POTASSIUM SERPL-MCNC: 4 MMOL/L — SIGNIFICANT CHANGE UP (ref 3.5–5.3)
POTASSIUM SERPL-MCNC: 4.3 MMOL/L — SIGNIFICANT CHANGE UP (ref 3.5–5.3)
POTASSIUM SERPL-SCNC: 4 MMOL/L — SIGNIFICANT CHANGE UP (ref 3.5–5.3)
POTASSIUM SERPL-SCNC: 4.3 MMOL/L — SIGNIFICANT CHANGE UP (ref 3.5–5.3)
PROT SERPL-MCNC: 5.8 GM/DL — LOW (ref 6–8.3)
RBC # BLD: 3.72 M/UL — LOW (ref 3.8–5.2)
RBC # BLD: 3.81 M/UL — SIGNIFICANT CHANGE UP (ref 3.8–5.2)
RBC # FLD: 13.5 % — SIGNIFICANT CHANGE UP (ref 10.3–14.5)
RBC # FLD: 13.7 % — SIGNIFICANT CHANGE UP (ref 10.3–14.5)
SODIUM SERPL-SCNC: 138 MMOL/L — SIGNIFICANT CHANGE UP (ref 135–145)
SODIUM SERPL-SCNC: 138 MMOL/L — SIGNIFICANT CHANGE UP (ref 135–145)
WBC # BLD: 8.82 K/UL — SIGNIFICANT CHANGE UP (ref 3.8–10.5)
WBC # BLD: 8.91 K/UL — SIGNIFICANT CHANGE UP (ref 3.8–10.5)
WBC # FLD AUTO: 8.82 K/UL — SIGNIFICANT CHANGE UP (ref 3.8–10.5)
WBC # FLD AUTO: 8.91 K/UL — SIGNIFICANT CHANGE UP (ref 3.8–10.5)

## 2023-04-05 PROCEDURE — 99233 SBSQ HOSP IP/OBS HIGH 50: CPT

## 2023-04-05 RX ORDER — CEFAZOLIN SODIUM 1 G
1000 VIAL (EA) INJECTION EVERY 8 HOURS
Refills: 0 | Status: DISCONTINUED | OUTPATIENT
Start: 2023-04-05 | End: 2023-04-05

## 2023-04-05 RX ORDER — MORPHINE SULFATE 50 MG/1
4 CAPSULE, EXTENDED RELEASE ORAL EVERY 6 HOURS
Refills: 0 | Status: DISCONTINUED | OUTPATIENT
Start: 2023-04-05 | End: 2023-04-06

## 2023-04-05 RX ORDER — OXYCODONE HYDROCHLORIDE 5 MG/1
5 TABLET ORAL EVERY 6 HOURS
Refills: 0 | Status: DISCONTINUED | OUTPATIENT
Start: 2023-04-05 | End: 2023-04-10

## 2023-04-05 RX ORDER — CEFAZOLIN SODIUM 1 G
1000 VIAL (EA) INJECTION EVERY 8 HOURS
Refills: 0 | Status: DISCONTINUED | OUTPATIENT
Start: 2023-04-05 | End: 2023-04-08

## 2023-04-05 RX ADMIN — MORPHINE SULFATE 4 MILLIGRAM(S): 50 CAPSULE, EXTENDED RELEASE ORAL at 17:15

## 2023-04-05 RX ADMIN — Medication 1000 MILLIGRAM(S): at 22:09

## 2023-04-05 RX ADMIN — Medication 500 MILLIGRAM(S): at 05:34

## 2023-04-05 RX ADMIN — OXYCODONE HYDROCHLORIDE 5 MILLIGRAM(S): 5 TABLET ORAL at 22:40

## 2023-04-05 RX ADMIN — Medication 750 MILLIGRAM(S): at 21:10

## 2023-04-05 RX ADMIN — ENOXAPARIN SODIUM 40 MILLIGRAM(S): 100 INJECTION SUBCUTANEOUS at 09:50

## 2023-04-05 RX ADMIN — Medication 300 MILLIGRAM(S): at 09:50

## 2023-04-05 RX ADMIN — Medication 750 MILLIGRAM(S): at 10:43

## 2023-04-05 RX ADMIN — SODIUM CHLORIDE 75 MILLILITER(S): 9 INJECTION, SOLUTION INTRAVENOUS at 05:37

## 2023-04-05 RX ADMIN — Medication 300 MILLIGRAM(S): at 20:54

## 2023-04-05 RX ADMIN — Medication 1000 MILLIGRAM(S): at 14:00

## 2023-04-05 RX ADMIN — MORPHINE SULFATE 4 MILLIGRAM(S): 50 CAPSULE, EXTENDED RELEASE ORAL at 16:34

## 2023-04-05 RX ADMIN — OXYCODONE HYDROCHLORIDE 5 MILLIGRAM(S): 5 TABLET ORAL at 22:11

## 2023-04-05 NOTE — PHYSICAL THERAPY INITIAL EVALUATION ADULT - NSPTDISCHREC_GEN_A_CORE
after procedure on Tuesday, pt will benefit from a re-eval to assess stairs post op. Pt lives alone.
Sub-acute Rehab

## 2023-04-05 NOTE — PHYSICAL THERAPY INITIAL EVALUATION ADULT - STANDING BALANCE: DYNAMIC, REHAB EVAL
no AD, pt able to ambulate and rotate the c/s as looking in multi-directions w/o postural sway noted. pt able to march in place 10 reps w/o LOB noted./good minus
fair minus

## 2023-04-05 NOTE — PROGRESS NOTE ADULT - SUBJECTIVE AND OBJECTIVE BOX
Patient was seen and examined at the bedside this morning  No acute events overnight  Pain is controlled, states mild discomfort on the right groin and RLE heaviness  No nausea or vomiting  Tolerating diet     Physical Exam:  General: AAOx3, Well developed, NAD  Chest: Normal respiratory effort  Heart: RRR  Abdomen: Soft, NTND  Neuro/Psych: No localized deficits. Normal speech normal tone  Skin: Normal, no rashes, no lesions noted.   Extremities: right groin dressing in place, clean and intact, 2 ivania drains in place with serosanguinous   Left groin dressing in place, clean and intact, 1 MILAN drains in place with serosanguinous            Vitals:  T(C): 36.5 ( @ 08:43), Max: 36.8 ( @ 21:06)  HR: 61 ( @ 08:43) (61 - 81)  BP: 122/54 ( @ 08:43) (122/54 - 145/65)  RR: 18 ( @ 08:43) (18 - 18)  SpO2: 98% ( @ 08:43) (97% - 98%)     @ 06:45                    11.7  CBC: 5.84>)-------(<325                     35.1                 - | - | -    CMP:  ----------------------< -               - | - | -                      Ca:-  Phos:-  Mg:-               -|      |-        LFTs:  ------|-|-----             -|      |-  - @ 05:31                    -  CBC: ->)-------(<-                     -                 140 | 110 | 14    CMP:  ----------------------< 145               4.2 | 24 | 0.62                      Ca:8.9  Phos:3.8  M.0               -|      |-        LFTs:  ------|-|-----             -|      |-   @ 07:12                    12.2  CBC: 5.68>)-------(<298                     36.6                 138 | 112 | 18    CMP:  ----------------------< 136               4.3 | 24 | 0.65                      Ca:8.7  Phos:3.2  M.1               -|      |-        LFTs:  ------|-|-----             -|      |-      Current Inpatient Medications:  acetaminophen   IVPB .. 750 milliGRAM(s) IV Intermittent every 6 hours PRN  enoxaparin Injectable 40 milliGRAM(s) SubCutaneous <User Schedule>  lactated ringers. 1000 milliLiter(s) (75 mL/Hr) IV Continuous <Continuous>  ondansetron Injectable 4 milliGRAM(s) IV Push once

## 2023-04-05 NOTE — PHYSICAL THERAPY INITIAL EVALUATION ADULT - DID THE PATIENT HAVE SURGERY?
POD1 after Right groin exploration with excision of mass and gracilis flap, left groin excision of necrotic lymph node./yes

## 2023-04-05 NOTE — PROGRESS NOTE ADULT - SUBJECTIVE AND OBJECTIVE BOX
Patient is a 82y old  Female who presents with a chief complaint of     BRIEF HOSPITAL COURSE:  81yo female with PMHx ovarian ca, colon ca was told to come to the hospital by her physician, Dr Clarke for possible drainage of a right groin abscess. Pt was diagnosed with Ovarian Ca 13 yrs ago for which she was had hysterectomy with BSO at [facility] 13 yrs ago. Pt also underwent chemotherapy and last completed a cycle about one month ago. About 2 months ago, pt stated that she noticed a mass in her right groin. Pt stated she has had multiple tests done on her and believes she had a biopsy of the left groin. The area started to drain purulent fluid in weeks to come then evolved into a open wound for which she needed wound care to com to the house. Pt stated that Dr Clarke sent her for a CT pelvis and said that they could not drain it in the office and sent her here.     Now s/p dissectio nof R inguinal LN with biopsy, excision of L inguinal LN with myocutaneous gracilis flap 4/4  Intra op findings include necrotic R and L inguinal LN   EBL 100cc  POD#1      4/5    PAST MEDICAL & SURGICAL HISTORY:  Ovarian cancer      Medications:  ceFAZolin  Injectable.      ceFAZolin  Injectable. 500 milliGRAM(s) IV Push every 8 hours  acetaminophen   IVPB .. 750 milliGRAM(s) IV Intermittent every 6 hours PRN  morphine  - Injectable 4 milliGRAM(s) IV Push every 6 hours PRN  ondansetron Injectable 4 milliGRAM(s) IV Push once  enoxaparin Injectable 40 milliGRAM(s) SubCutaneous <User Schedule>  lactated ringers. 1000 milliLiter(s) IV Continuous <Continuous>    ICU Vital Signs Last 24 Hrs  T(C): 36.5 (05 Apr 2023 09:10), Max: 36.5 (05 Apr 2023 09:10)  T(F): 97.7 (05 Apr 2023 09:10), Max: 97.7 (05 Apr 2023 09:10)  HR: 60 (05 Apr 2023 10:00) (53 - 75)  BP: 135/49 (05 Apr 2023 08:00) (121/59 - 135/49)  BP(mean): 72 (05 Apr 2023 08:00) (72 - 78)  ABP: 154/65 (05 Apr 2023 10:00) (122/50 - 154/65)  ABP(mean): 98 (05 Apr 2023 10:00) (78 - 98)  RR: 12 (05 Apr 2023 10:00) (9 - 24)  SpO2: 99% (05 Apr 2023 10:00) (95% - 100%)    O2 Parameters below as of 05 Apr 2023 10:00  Patient On (Oxygen Delivery Method): room air      I&O's Detail    04 Apr 2023 07:01  -  05 Apr 2023 07:00  --------------------------------------------------------  IN:    Lactated Ringers: 150 mL    Other (mL): 1600 mL  Total IN: 1750 mL    OUT:    Blood Loss (mL): 100 mL    Bulb (mL): 30 mL    Bulb (mL): 25 mL    Bulb (mL): 45 mL    Indwelling Catheter - Urethral (mL): 710 mL    Other (mL): 550 mL  Total OUT: 1460 mL    Total NET: 290 mL      LABS:                        11.2   8.82  )-----------( 290      ( 05 Apr 2023 05:30 )             32.9     04-05    138  |  109<H>  |  11  ----------------------------<  165<H>  4.3   |  25  |  0.50    Ca    8.3<L>      05 Apr 2023 05:30  Phos  3.9     04-05  Mg     1.9     04-05    TPro  5.8<L>  /  Alb  2.6<L>  /  TBili  0.4  /  DBili  x   /  AST  18  /  ALT  16  /  AlkPhos  66  04-04          CAPILLARY BLOOD GLUCOSE        PT/INR - ( 04 Apr 2023 06:45 )   PT: 12.1 sec;   INR: 1.04 ratio             CULTURES:      Physical Examination:    General: No acute distress.    PULM: Clear to auscultation bilaterally, no significant sputum production  NECK: Supple, no lymphadenopathy, trachea midline  CVS: Regular rate and rhythm, no murmurs  ABD: Soft, nondistended, nontender, normoactive bowel sounds  EXT: No edema, nontender  SKIN: Warm and well perfused, no rashes noted.  NEURO: Alert, oriented, interactive, nonfocal    DEVICES:   3 MILAN   steele    RADIOLOGY:      Patient is a 82y old  Female who presents with a chief complaint of     BRIEF HOSPITAL COURSE:  83yo female with PMHx ovarian ca, colon ca was told to come to the hospital by her physician, Dr Clarke for possible drainage of a right groin abscess. Pt was diagnosed with Ovarian Ca 13 yrs ago for which she was had hysterectomy with BSO at [facility] 13 yrs ago. Pt also underwent chemotherapy and last completed a cycle about one month ago. About 2 months ago, pt stated that she noticed a mass in her right groin. Pt stated she has had multiple tests done on her and believes she had a biopsy of the left groin. The area started to drain purulent fluid in weeks to come then evolved into a open wound for which she needed wound care to com to the house. Pt stated that Dr Clarke sent her for a CT pelvis and said that they could not drain it in the office and sent her here.     Now s/p dissectio nof R inguinal LN with biopsy, excision of L inguinal LN with myocutaneous gracilis flap 4/4  Intra op findings include necrotic R and L inguinal LN   EBL 100cc  POD#1    4/5 pt reporting pain at groin incision sites, denies chest pain, sob, nausea, abd pain, pain in legs.    PAST MEDICAL & SURGICAL HISTORY:  Ovarian cancer      Medications:  ceFAZolin  Injectable.      ceFAZolin  Injectable. 500 milliGRAM(s) IV Push every 8 hours  acetaminophen   IVPB .. 750 milliGRAM(s) IV Intermittent every 6 hours PRN  morphine  - Injectable 4 milliGRAM(s) IV Push every 6 hours PRN  ondansetron Injectable 4 milliGRAM(s) IV Push once  enoxaparin Injectable 40 milliGRAM(s) SubCutaneous <User Schedule>  lactated ringers. 1000 milliLiter(s) IV Continuous <Continuous>    ICU Vital Signs Last 24 Hrs  T(C): 36.5 (05 Apr 2023 09:10), Max: 36.5 (05 Apr 2023 09:10)  T(F): 97.7 (05 Apr 2023 09:10), Max: 97.7 (05 Apr 2023 09:10)  HR: 60 (05 Apr 2023 10:00) (53 - 75)  BP: 135/49 (05 Apr 2023 08:00) (121/59 - 135/49)  BP(mean): 72 (05 Apr 2023 08:00) (72 - 78)  ABP: 154/65 (05 Apr 2023 10:00) (122/50 - 154/65)  ABP(mean): 98 (05 Apr 2023 10:00) (78 - 98)  RR: 12 (05 Apr 2023 10:00) (9 - 24)  SpO2: 99% (05 Apr 2023 10:00) (95% - 100%)    O2 Parameters below as of 05 Apr 2023 10:00  Patient On (Oxygen Delivery Method): room air      I&O's Detail    04 Apr 2023 07:01  -  05 Apr 2023 07:00  --------------------------------------------------------  IN:    Lactated Ringers: 150 mL    Other (mL): 1600 mL  Total IN: 1750 mL    OUT:    Blood Loss (mL): 100 mL    Bulb (mL): 30 mL    Bulb (mL): 25 mL    Bulb (mL): 45 mL    Indwelling Catheter - Urethral (mL): 710 mL    Other (mL): 550 mL  Total OUT: 1460 mL    Total NET: 290 mL      LABS:                        11.2   8.82  )-----------( 290      ( 05 Apr 2023 05:30 )             32.9     04-05    138  |  109<H>  |  11  ----------------------------<  165<H>  4.3   |  25  |  0.50    Ca    8.3<L>      05 Apr 2023 05:30  Phos  3.9     04-05  Mg     1.9     04-05    TPro  5.8<L>  /  Alb  2.6<L>  /  TBili  0.4  /  DBili  x   /  AST  18  /  ALT  16  /  AlkPhos  66  04-04          CAPILLARY BLOOD GLUCOSE        PT/INR - ( 04 Apr 2023 06:45 )   PT: 12.1 sec;   INR: 1.04 ratio             CULTURES:      Physical Examination:    General: No acute distress.    PULM: Clear to auscultation bilaterally, no crackles or weeizng  NECK: Supple, no lymphadenopathy, trachea midline  CVS: Regular rate and rhythm, no murmurs  ABD: Soft, nondistended, nontender, normoactive bowel sounds. steele  EXT: No edema, nontender. 3 JPs  SKIN: Warm and well perfused, pulse present DP b/l. pt asked me to examine her groin sites at a later time  NEURO: Alert, oriented, interactive, nonfocal    DEVICES:   3 MILAN   steele    RADIOLOGY:

## 2023-04-05 NOTE — PHYSICAL THERAPY INITIAL EVALUATION ADULT - PERTINENT HX OF CURRENT PROBLEM, REHAB EVAL
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 - She is c/o right groin pain with open, draining, foul smelling purulent drainage, 4/4/23 s/p radical dissection of groin and muscle flap as well as necrotic LN excision, pedicled right gracilis flap and complex closure of groin over mm flap 18 cm

## 2023-04-05 NOTE — PHYSICAL THERAPY INITIAL EVALUATION ADULT - PHYSICAL ASSIST/NONPHYSICAL ASSIST: STAND/SIT, REHAB EVAL
supervision
2 person assist to place pt in chair-pt boosted in chair/supervision/verbal cues/2 person assist

## 2023-04-05 NOTE — PROGRESS NOTE ADULT - ASSESSMENT
An 82 year old female with abscess of right groin and mass left groin, with remote history of ovarian cancer s/p PITA/BSO  POD1 after Right groin exploration with excision of mass and gracilis flap, left groin excision of necrotic lymph node.    Plan:    Regular diet  Cefazolin   cont pain control  pain control  OOBTC   Ambulation PT assessment  monitor drain output    Pt seen and examined with Dr Rankin

## 2023-04-05 NOTE — PROGRESS NOTE ADULT - SUBJECTIVE AND OBJECTIVE BOX
INTERVAL HPI/OVERNIGHT EVENTS:  Patient S&E at bedside.  Pt s/p surgery yesterday  mild pain this morning 4/10 asking for meds  discussed case with Dr. Clarke    PAST MEDICAL & SURGICAL HISTORY:  Ovarian cancer          FAMILY HISTORY:      VITAL SIGNS:  T(F): 97.5 (04-05-23 @ 06:00)  HR: 53 (04-05-23 @ 08:00)  BP: 135/49 (04-05-23 @ 08:00)  RR: 9 (04-05-23 @ 08:00)  SpO2: 98% (04-05-23 @ 08:00)  Wt(kg): --      GENERAL: NAD  CHEST/LUNG: Clear to auscultation bilaterally; No wheeze  HEART: Regular rate and rhythm;   ABDOMEN: Soft, Nontender, Nondistended;  EXTREMITIES:  no edema  NEUROLOGY: non-focal  SKIN: right groin with bandage in place     MEDICATIONS  (STANDING):  ceFAZolin  Injectable.      ceFAZolin  Injectable. 500 milliGRAM(s) IV Push every 8 hours  enoxaparin Injectable 40 milliGRAM(s) SubCutaneous <User Schedule>  lactated ringers. 1000 milliLiter(s) (75 mL/Hr) IV Continuous <Continuous>  ondansetron Injectable 4 milliGRAM(s) IV Push once    MEDICATIONS  (PRN):  acetaminophen   IVPB .. 750 milliGRAM(s) IV Intermittent every 6 hours PRN Temp greater or equal to 38C (100.4F), Mild Pain (1 - 3)  morphine  - Injectable 4 milliGRAM(s) IV Push every 6 hours PRN Moderate Pain (4 - 6)      Allergies    penicillin (Unknown)    Intolerances        LABS:                        11.2   8.82  )-----------( 290      ( 05 Apr 2023 05:30 )             32.9     04-05    138  |  109<H>  |  11  ----------------------------<  165<H>  4.3   |  25  |  0.50    Ca    8.3<L>      05 Apr 2023 05:30  Phos  3.9     04-05  Mg     1.9     04-05    TPro  5.8<L>  /  Alb  2.6<L>  /  TBili  0.4  /  DBili  x   /  AST  18  /  ALT  16  /  AlkPhos  66  04-04    PT/INR - ( 04 Apr 2023 06:45 )   PT: 12.1 sec;   INR: 1.04 ratio               RADIOLOGY & ADDITIONAL TESTS:  Studies reviewed.

## 2023-04-05 NOTE — PHYSICAL THERAPY INITIAL EVALUATION ADULT - DIAGNOSIS, PT EVAL
POD1 after Right groin exploration with excision of mass and gracilis flap, left groin excision of necrotic lymph node. Abscess of right groin and mass left groin , POD1 after Right groin exploration with excision of mass and gracilis flap, left groin excision of necrotic lymph node.

## 2023-04-05 NOTE — PROGRESS NOTE ADULT - SUBJECTIVE AND OBJECTIVE BOX
82y Female   POD 1 s/p Right pedicled gracilics muscle flap and complex closure of right groin. Doing well, pain controlled no issues over night.      T(C): 36.4 (04-05-23 @ 06:00), Max: 36.5 (04-04-23 @ 08:43)  HR: 60 (04-05-23 @ 04:00) (60 - 75)  BP: 123/59 (04-05-23 @ 04:00) (121/59 - 133/55)  RR: 13 (04-05-23 @ 04:00) (10 - 24)  SpO2: 100% (04-05-23 @ 04:00) (95% - 100%)    04-04-23 @ 07:01  -  04-05-23 @ 06:14  --------------------------------------------------------  IN: 1750 mL / OUT: 1420 mL / NET: 330 mL    I&O's Detail    04 Apr 2023 07:01  -  05 Apr 2023 06:15  --------------------------------------------------------  IN:    Lactated Ringers: 150 mL    Other (mL): 1600 mL  Total IN: 1750 mL    OUT:    Blood Loss (mL): 100 mL    Bulb (mL): 15 mL    Bulb (mL): 10 mL    Bulb (mL): 35 mL    Indwelling Catheter - Urethral (mL): 710 mL    Other (mL): 550 mL  Total OUT: 1420 mL    Total NET: 330 mL              PE: Gen- NAD, AAOx3  CVS- RRR  Chest- equal chest expansion  Abd- Soft, NT, ND    Focused: Dressing C/D/I, no palpable fluid collections, no surrounding erythema, discharge or signs of infection  Drains in place, serosanguinous drainage

## 2023-04-05 NOTE — PROGRESS NOTE ADULT - ASSESSMENT
82y Female POD 1 s/p Right pedicled gracilis muscle flap and complex closure of right groin. Doing well,     -care as per primary team  -HOB 0-45 degrees, no sitting upright in chair, can sit in recliner  - pain control  - monitor I/O's  - OOb/ambulate with assistance

## 2023-04-05 NOTE — PROGRESS NOTE ADULT - ASSESSMENT
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 - She is c/o right groin pain with open, draining, foul smelling purulent drainage    # h/o recurrent serous ovarian cancer   - pt last received doxil/avastin on 1/27/23  - doxil was dose reduced because of poor tolerance   - @ [facility] 2/22 CT C/A/P showed since 2/9 there was slightly decreased b/l inguinal adenopathy with new skin defect in right groin abutting the right inguinal LN  - @ [facility] - 3/15 CT pelvis showed since 2/22/23 slightly increased size of necrotic right inguinal eric metastasis w/ increased fluid component and new foci of air w/ slightly increased soft tissue inflammatory change, unchanged inguinal eric and right external iliac/deep inguinal eric mets, slightly increased cystic lesion with unchanged adjacent solid component in right pelvis, possibly implant.   - will need to restart outpt - discussed case with dr clarke today     # right groin abscess  - CT to r/o abscess- 3.4 x 2.8 x 3 cm rim enhancing fluid collection in the right groin with discontinuity of the surrounding skin suggestive of draining abscess. 3.6 x 3.0 cm left inguinal soft tissue mass suspicious for malignancy. Indeterminant right pelvic sidewall cystic structure measuring 2.6 x 1.8 cm.  - 4/4/23 s/p radical dissection of groin and muscle flap as well as necrotic LN excision, pedicled right gracilis flap and complex closure of groin over mm flap 18 cm   - on cefazolin    will f/u with surgery team thereafter   discussed with Dr. Clarke at [facility] today

## 2023-04-05 NOTE — PROGRESS NOTE ADULT - ASSESSMENT
ASSESSMENT  83yo female with PMHx ovarian ca, colon ca was told to come to the hospital by her physician, Dr Clarke for drainage of a right groin abscess    Now s/p dissection of R inguinal LN with biopsy, excision of L inguinal LN with myocutaneous gracilis flap 4/4  Intra op findings include necrotic R and L inguinal LN   EBL 100cc  POD#1    PLAN ASSESSMENT  83yo female with PMHx ovarian ca, colon ca was told to come to the hospital by her physician, Dr Clarke for drainage of a right groin abscess    Now s/p dissection of R inguinal LN with biopsy, excision of L inguinal LN with myocutaneous gracilis flap 4/4  Intra op findings include necrotic R and L inguinal LN   EBL 100cc  POD#1    PLAN  - mentation intact pain control prn  - BP stable cont to monitor  - on room air , encourage incentive spirometer  - PO diet. bowel regimen prn  - will DC IV fluids as pt taking PO  - Cr stable. steele in place monitor I & Os  - 2 JPs on R leg, 1 MILAN on L leg. appear sanguinous  - increased cefazolin to 1gm q8hr as per pharm recs. afebrile overnight.   - f/u surgical pathology  - DVT ppx - lovenox sq.     Dispo: SICU. pain control. monitor MILAN output. f/u surgical pathology    Will discuss with Dr. Dhillon

## 2023-04-06 LAB
ANION GAP SERPL CALC-SCNC: 4 MMOL/L — LOW (ref 5–17)
BASOPHILS # BLD AUTO: 0.05 K/UL — SIGNIFICANT CHANGE UP (ref 0–0.2)
BASOPHILS NFR BLD AUTO: 0.6 % — SIGNIFICANT CHANGE UP (ref 0–2)
BUN SERPL-MCNC: 11 MG/DL — SIGNIFICANT CHANGE UP (ref 7–23)
CALCIUM SERPL-MCNC: 8.4 MG/DL — LOW (ref 8.5–10.1)
CHLORIDE SERPL-SCNC: 108 MMOL/L — SIGNIFICANT CHANGE UP (ref 96–108)
CO2 SERPL-SCNC: 28 MMOL/L — SIGNIFICANT CHANGE UP (ref 22–31)
CREAT SERPL-MCNC: 0.74 MG/DL — SIGNIFICANT CHANGE UP (ref 0.5–1.3)
EGFR: 81 ML/MIN/1.73M2 — SIGNIFICANT CHANGE UP
EOSINOPHIL # BLD AUTO: 0.1 K/UL — SIGNIFICANT CHANGE UP (ref 0–0.5)
EOSINOPHIL NFR BLD AUTO: 1.2 % — SIGNIFICANT CHANGE UP (ref 0–6)
GLUCOSE SERPL-MCNC: 155 MG/DL — HIGH (ref 70–99)
HCT VFR BLD CALC: 34 % — LOW (ref 34.5–45)
HGB BLD-MCNC: 11.3 G/DL — LOW (ref 11.5–15.5)
IMM GRANULOCYTES NFR BLD AUTO: 0.4 % — SIGNIFICANT CHANGE UP (ref 0–0.9)
LYMPHOCYTES # BLD AUTO: 1.09 K/UL — SIGNIFICANT CHANGE UP (ref 1–3.3)
LYMPHOCYTES # BLD AUTO: 13.6 % — SIGNIFICANT CHANGE UP (ref 13–44)
MAGNESIUM SERPL-MCNC: 1.9 MG/DL — SIGNIFICANT CHANGE UP (ref 1.6–2.6)
MCHC RBC-ENTMCNC: 29.7 PG — SIGNIFICANT CHANGE UP (ref 27–34)
MCHC RBC-ENTMCNC: 33.2 GM/DL — SIGNIFICANT CHANGE UP (ref 32–36)
MCV RBC AUTO: 89.2 FL — SIGNIFICANT CHANGE UP (ref 80–100)
MONOCYTES # BLD AUTO: 0.91 K/UL — HIGH (ref 0–0.9)
MONOCYTES NFR BLD AUTO: 11.3 % — SIGNIFICANT CHANGE UP (ref 2–14)
NEUTROPHILS # BLD AUTO: 5.84 K/UL — SIGNIFICANT CHANGE UP (ref 1.8–7.4)
NEUTROPHILS NFR BLD AUTO: 72.9 % — SIGNIFICANT CHANGE UP (ref 43–77)
PHOSPHATE SERPL-MCNC: 2.2 MG/DL — LOW (ref 2.5–4.5)
PLATELET # BLD AUTO: 290 K/UL — SIGNIFICANT CHANGE UP (ref 150–400)
POTASSIUM SERPL-MCNC: 4.1 MMOL/L — SIGNIFICANT CHANGE UP (ref 3.5–5.3)
POTASSIUM SERPL-SCNC: 4.1 MMOL/L — SIGNIFICANT CHANGE UP (ref 3.5–5.3)
RBC # BLD: 3.81 M/UL — SIGNIFICANT CHANGE UP (ref 3.8–5.2)
RBC # FLD: 13.6 % — SIGNIFICANT CHANGE UP (ref 10.3–14.5)
SODIUM SERPL-SCNC: 140 MMOL/L — SIGNIFICANT CHANGE UP (ref 135–145)
WBC # BLD: 8.02 K/UL — SIGNIFICANT CHANGE UP (ref 3.8–10.5)
WBC # FLD AUTO: 8.02 K/UL — SIGNIFICANT CHANGE UP (ref 3.8–10.5)

## 2023-04-06 PROCEDURE — 99233 SBSQ HOSP IP/OBS HIGH 50: CPT

## 2023-04-06 RX ORDER — ACETAMINOPHEN 500 MG
1000 TABLET ORAL ONCE
Refills: 0 | Status: COMPLETED | OUTPATIENT
Start: 2023-04-06 | End: 2023-04-06

## 2023-04-06 RX ORDER — SODIUM,POTASSIUM PHOSPHATES 278-250MG
2 POWDER IN PACKET (EA) ORAL ONCE
Refills: 0 | Status: COMPLETED | OUTPATIENT
Start: 2023-04-06 | End: 2023-04-06

## 2023-04-06 RX ORDER — ACETAMINOPHEN 500 MG
1000 TABLET ORAL ONCE
Refills: 0 | Status: DISCONTINUED | OUTPATIENT
Start: 2023-04-06 | End: 2023-04-10

## 2023-04-06 RX ADMIN — Medication 400 MILLIGRAM(S): at 11:57

## 2023-04-06 RX ADMIN — Medication 1000 MILLIGRAM(S): at 05:47

## 2023-04-06 RX ADMIN — ENOXAPARIN SODIUM 40 MILLIGRAM(S): 100 INJECTION SUBCUTANEOUS at 09:56

## 2023-04-06 RX ADMIN — OXYCODONE HYDROCHLORIDE 5 MILLIGRAM(S): 5 TABLET ORAL at 06:35

## 2023-04-06 RX ADMIN — Medication 1000 MILLIGRAM(S): at 23:20

## 2023-04-06 RX ADMIN — OXYCODONE HYDROCHLORIDE 5 MILLIGRAM(S): 5 TABLET ORAL at 21:37

## 2023-04-06 RX ADMIN — OXYCODONE HYDROCHLORIDE 5 MILLIGRAM(S): 5 TABLET ORAL at 06:55

## 2023-04-06 RX ADMIN — OXYCODONE HYDROCHLORIDE 5 MILLIGRAM(S): 5 TABLET ORAL at 22:00

## 2023-04-06 RX ADMIN — Medication 1000 MILLIGRAM(S): at 13:25

## 2023-04-06 RX ADMIN — Medication 400 MILLIGRAM(S): at 23:06

## 2023-04-06 RX ADMIN — Medication 1000 MILLIGRAM(S): at 21:37

## 2023-04-06 RX ADMIN — Medication 2 TABLET(S): at 09:57

## 2023-04-06 RX ADMIN — Medication 1000 MILLIGRAM(S): at 12:08

## 2023-04-06 NOTE — PROGRESS NOTE ADULT - ASSESSMENT
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 - She is c/o right groin pain with open, draining, foul smelling purulent drainage    # h/o recurrent serous ovarian cancer   - pt last received doxil/avastin on 1/27/23  - doxil was dose reduced because of poor tolerance   - will need to restart outpt  once infection  under control     # right groin abscess  - 4/4/23 s/p radical dissection of groin and muscle flap as well as necrotic LN excision, pedicled right gracilis flap and complex closure of groin over mm flap 18 cm   - on cefazolin  - Now with ongoing pain  - would continue with pain regimen   - may require increasing regimen to facilite pt

## 2023-04-06 NOTE — PROGRESS NOTE ADULT - ASSESSMENT
82y Female POD 2 s/p Right pedicled gracilis muscle flap and complex closure of right groin. Doing well,     -care as per primary team  -HOB 0-45 degrees, no sitting upright in chair, can sit in recliner  - pain control  - monitor I/O's  - OOb/ambulate with assistance

## 2023-04-06 NOTE — PROGRESS NOTE ADULT - ASSESSMENT
ASSESSMENT  81yo female with PMHx ovarian ca, colon ca was told to come to the hospital by her physician, Dr Clarke for drainage of a right groin abscess    Now s/p dissection of R inguinal LN with biopsy, excision of L inguinal LN with myocutaneous gracilis flap 4/4  Intra op findings include necrotic R and L inguinal LN   EBL 100cc  POD#2    PLAN  - mentation intact pain control prn  - BP stable cont to monitor  - on room air , encourage incentive spirometer  - PO diet. bowel regimen prn  - Cr stable. monitor I & Os  - 2 JPs on R leg, 1 MILAN on L leg. appear sanguinous. mgmt as per surgery  - cont cefazolin to 1gm q8hr as per pharm recs. afebrile overnight.   - f/u surgical pathology- still pending  - DVT ppx - lovenox sq.     Dispo: Stable for floor if okay with surgery.. pain control. monitor MILAN output. f/u surgical pathology    Will discuss with Dr. Dhillon

## 2023-04-06 NOTE — PROGRESS NOTE ADULT - SUBJECTIVE AND OBJECTIVE BOX
82y Female POD 2 s/p Right pedicled gracilis muscle flap and complex closure of right groin. Doing well, pain controlled no issues over night.      ICU Vital Signs Last 24 Hrs  T(C): 36.9 (06 Apr 2023 05:44), Max: 37.1 (05 Apr 2023 17:35)  T(F): 98.5 (06 Apr 2023 05:44), Max: 98.8 (05 Apr 2023 17:35)  HR: 63 (06 Apr 2023 04:00) (53 - 78)  BP: 117/76 (06 Apr 2023 04:00) (107/86 - 157/63)  BP(mean): 91 (06 Apr 2023 04:00) (64 - 100)  ABP: 139/53 (05 Apr 2023 12:00) (134/48 - 154/65)  ABP(mean): 83 (05 Apr 2023 12:00) (78 - 98)  RR: 17 (06 Apr 2023 04:00) (7 - 24)  SpO2: 96% (06 Apr 2023 00:00) (96% - 99%)    O2 Parameters below as of 05 Apr 2023 22:00  Patient On (Oxygen Delivery Method): room air        I&O's Detail    04 Apr 2023 07:01  -  05 Apr 2023 07:00  --------------------------------------------------------  IN:    Lactated Ringers: 150 mL    Other (mL): 1600 mL  Total IN: 1750 mL    OUT:    Blood Loss (mL): 100 mL    Bulb (mL): 25 mL    Bulb (mL): 45 mL    Bulb (mL): 30 mL    Indwelling Catheter - Urethral (mL): 710 mL    Other (mL): 550 mL  Total OUT: 1460 mL    Total NET: 290 mL      05 Apr 2023 07:01  -  06 Apr 2023 06:20  --------------------------------------------------------  IN:    IV PiggyBack: 100 mL  Total IN: 100 mL    OUT:    Bulb (mL): 30 mL    Bulb (mL): 210 mL    Bulb (mL): 40 mL    Indwelling Catheter - Urethral (mL): 2650 mL  Total OUT: 2930 mL    Total NET: -2830 mL                  PE: Gen- NAD, AAOx3  CVS- RRR  Chest- equal chest expansion  Abd- Soft, NT, ND    Focused: Dressing C/D/I, no palpable fluid collections, no surrounding erythema, discharge or signs of infection  Drains in place, serosanguinous drainage

## 2023-04-06 NOTE — PROGRESS NOTE ADULT - ASSESSMENT
82 F with abscess of right groin and mass left groin, with remote history of ovarian cancer s/p PITA/BSO  POD#2 after Right groin exploration with excision of mass and gracilis flap, left groin excision of necrotic lymph node.    Plan:    Regular diet  Cefazolin   cont pain control  pain control  HOB 0-45 degrees, no sitting upright in chair, can sit in recliner  Ambulation PT assessment  monitor drain output    Plan d/w Dr. Davis

## 2023-04-06 NOTE — PROGRESS NOTE ADULT - NS ATTEND AMEND GEN_ALL_CORE FT
s/p dissection of R inguinal LN with biopsy, excision of L inguinal LN with myocutaneous gracilis flap 4/4  Intra op findings include necrotic R and L inguinal LN     Plan:  SICU  PO Diet  pain Control  JPs per surgery  OOB  Path PND
s/p dissection of R inguinal LN with biopsy, excision of L inguinal LN with myocutaneous gracilis flap 4/4    Plan:  SICU  Pain control  Po Diet  IS  D/C IVF

## 2023-04-06 NOTE — PROGRESS NOTE ADULT - SUBJECTIVE AND OBJECTIVE BOX
Patient was seen and examined at the bedside this morning  No acute events overnight. Pain is controlled, states mild discomfort on the right groin and RLE heaviness  No nausea or vomiting. Tolerating diet     Physical Exam:  General: AAOx3, Well developed, NAD  Chest: Normal respiratory effort  Heart: RRR  Abdomen: Soft, NTND  Neuro/Psych: No localized deficits. Normal speech normal tone  Skin: Normal, no rashes, no lesions noted.   Extremities: right groin dressing in place, clean and intact, 2 ivania drains in place with serosanguinous   Left groin dressing in place, clean and intact, 1 MILAN drains in place with serosanguinous                MEDICATIONS  (STANDING):  ceFAZolin  Injectable. 1000 milliGRAM(s) IV Push every 8 hours  enoxaparin Injectable 40 milliGRAM(s) SubCutaneous <User Schedule>  ondansetron Injectable 4 milliGRAM(s) IV Push once  potassium phosphate / sodium phosphate Tablet (K-PHOS No. 2) 2 Tablet(s) Oral once    MEDICATIONS  (PRN):  acetaminophen   IVPB .. 750 milliGRAM(s) IV Intermittent every 6 hours PRN Temp greater or equal to 38C (100.4F), Mild Pain (1 - 3)  morphine  - Injectable 4 milliGRAM(s) IV Push every 6 hours PRN Severe Pain (7 - 10)  oxyCODONE    IR 5 milliGRAM(s) Oral every 6 hours PRN Moderate Pain (4 - 6)      PAST MEDICAL & SURGICAL HISTORY:  Ovarian cancer          Vital Signs Last 24 Hrs  T(C): 36.9 (06 Apr 2023 05:44), Max: 37.1 (05 Apr 2023 17:35)  T(F): 98.5 (06 Apr 2023 05:44), Max: 98.8 (05 Apr 2023 17:35)  HR: 68 (06 Apr 2023 08:00) (60 - 78)  BP: 133/52 (06 Apr 2023 08:00) (107/86 - 157/63)  BP(mean): 74 (06 Apr 2023 08:00) (64 - 100)  RR: 18 (06 Apr 2023 08:00) (7 - 24)  SpO2: 96% (06 Apr 2023 08:00) (96% - 99%)    Parameters below as of 06 Apr 2023 08:00  Patient On (Oxygen Delivery Method): room air        I&O's Summary    05 Apr 2023 07:01  -  06 Apr 2023 07:00  --------------------------------------------------------  IN: 100 mL / OUT: 2930 mL / NET: -2830 mL                              11.3   8.02  )-----------( 290      ( 06 Apr 2023 05:57 )             34.0     04-06    140  |  108  |  11  ----------------------------<  155<H>  4.1   |  28  |  0.74    Ca    8.4<L>      06 Apr 2023 05:57  Phos  2.2     04-06  Mg     1.9     04-06    TPro  5.8<L>  /  Alb  2.6<L>  /  TBili  0.4  /  DBili  x   /  AST  18  /  ALT  16  /  AlkPhos  66  04-04

## 2023-04-06 NOTE — PROGRESS NOTE ADULT - SUBJECTIVE AND OBJECTIVE BOX
INTERVAL HPI/OVERNIGHT EVENTS:  Patient S&E at bedside. No o/n events,   Patient complains of leg pain. patient has not been out of the bed     VITAL SIGNS:  T(F): 98.3 (04-06-23 @ 09:46)  HR: 66 (04-06-23 @ 12:11)  BP: 154/118 (04-06-23 @ 12:11)  RR: 16 (04-06-23 @ 12:11)  SpO2: 96% (04-06-23 @ 12:11)  Wt(kg): --    PHYSICAL EXAM:    Constitutional: NAD  Eyes: EOMI, sclera non-icteric  Neck: supple, no masses, no JVD  Respiratory: CTA b/l, good air entry b/l  Cardiovascular: RRR, no M/R/G  Gastrointestinal: soft, NTND, no masses palpable, + BS, no hepatosplenomegaly  Extremities: no c/  Neurological: AAOx3      MEDICATIONS  (STANDING):  acetaminophen   IVPB .. 1000 milliGRAM(s) IV Intermittent once  ceFAZolin  Injectable. 1000 milliGRAM(s) IV Push every 8 hours  enoxaparin Injectable 40 milliGRAM(s) SubCutaneous <User Schedule>  ondansetron Injectable 4 milliGRAM(s) IV Push once    MEDICATIONS  (PRN):  oxyCODONE    IR 5 milliGRAM(s) Oral every 6 hours PRN Moderate Pain (4 - 6)      Allergies    penicillin (Unknown)    Intolerances        LABS:                        11.3   8.02  )-----------( 290      ( 06 Apr 2023 05:57 )             34.0     04-06    140  |  108  |  11  ----------------------------<  155<H>  4.1   |  28  |  0.74    Ca    8.4<L>      06 Apr 2023 05:57  Phos  2.2     04-06  Mg     1.9     04-06    TPro  5.8<L>  /  Alb  2.6<L>  /  TBili  0.4  /  DBili  x   /  AST  18  /  ALT  16  /  AlkPhos  66  04-04          RADIOLOGY & ADDITIONAL TESTS:  Studies reviewed.    ASSESSMENT & PLAN:

## 2023-04-06 NOTE — PROGRESS NOTE ADULT - SUBJECTIVE AND OBJECTIVE BOX
Patient is a 82y old  Female who presents with a chief complaint of     BRIEF HOSPITAL COURSE:  83yo female with PMHx ovarian ca, colon ca was told to come to the hospital by her physician, Dr Clarke for possible drainage of a right groin abscess. Pt was diagnosed with Ovarian Ca 13 yrs ago for which she was had hysterectomy with BSO at [facility] 13 yrs ago. Pt also underwent chemotherapy and last completed a cycle about one month ago. About 2 months ago, pt stated that she noticed a mass in her right groin. Pt stated she has had multiple tests done on her and believes she had a biopsy of the left groin. The area started to drain purulent fluid in weeks to come then evolved into a open wound for which she needed wound care to com to the house. Pt stated that Dr Clarke sent her for a CT pelvis and said that they could not drain it in the office and sent her here.     Now s/p dissectio nof R inguinal LN with biopsy, excision of L inguinal LN with myocutaneous gracilis flap 4/4  Intra op findings include necrotic R and L inguinal LN   EBL 100cc  POD#2    4/5 pt reporting pain at groin incision sites, denies chest pain, sob, nausea, abd pain, pain in legs.  4/6 pt feeling better today, pain better controlled. sitting in chair. tolerating diet    PAST MEDICAL & SURGICAL HISTORY:  Ovarian cancer      Medications:  ceFAZolin  Injectable.      ceFAZolin  Injectable. 500 milliGRAM(s) IV Push every 8 hours  acetaminophen   IVPB .. 750 milliGRAM(s) IV Intermittent every 6 hours PRN  morphine  - Injectable 4 milliGRAM(s) IV Push every 6 hours PRN  ondansetron Injectable 4 milliGRAM(s) IV Push once  enoxaparin Injectable 40 milliGRAM(s) SubCutaneous <User Schedule>  lactated ringers. 1000 milliLiter(s) IV Continuous <Continuous>      Vital Signs Last 24 Hrs  T(C): 36.4 (06 Apr 2023 14:32), Max: 37.1 (05 Apr 2023 17:35)  T(F): 97.5 (06 Apr 2023 14:32), Max: 98.8 (05 Apr 2023 17:35)  HR: 66 (06 Apr 2023 12:11) (63 - 78)  BP: 154/118 (06 Apr 2023 12:11) (108/45 - 157/63)  BP(mean): 130 (06 Apr 2023 12:11) (64 - 130)  RR: 16 (06 Apr 2023 12:11) (12 - 24)  SpO2: 96% (06 Apr 2023 12:11) (96% - 98%)    Parameters below as of 06 Apr 2023 12:11  Patient On (Oxygen Delivery Method): room air      I&O's Detail    05 Apr 2023 07:01  -  06 Apr 2023 07:00  --------------------------------------------------------  IN:    IV PiggyBack: 100 mL  Total IN: 100 mL    OUT:    Bulb (mL): 30 mL    Bulb (mL): 210 mL    Bulb (mL): 40 mL    Indwelling Catheter - Urethral (mL): 2650 mL  Total OUT: 2930 mL    Total NET: -2830 mL      LABS:                                    11.3   8.02  )-----------( 290      ( 06 Apr 2023 05:57 )             34.0     04-06    140  |  108  |  11  ----------------------------<  155<H>  4.1   |  28  |  0.74    Ca    8.4<L>      06 Apr 2023 05:57  Phos  2.2     04-06  Mg     1.9     04-06    TPro  5.8<L>  /  Alb  2.6<L>  /  TBili  0.4  /  DBili  x   /  AST  18  /  ALT  16  /  AlkPhos  66  04-04      LIVER FUNCTIONS - ( 04 Apr 2023 23:59 )  Alb: 2.6 g/dL / Pro: 5.8 gm/dL / ALK PHOS: 66 U/L / ALT: 16 U/L / AST: 18 U/L / GGT: x           CAPILLARY BLOOD GLUCOSE  PT/INR - ( 04 Apr 2023 06:45 )   PT: 12.1 sec;   INR: 1.04 ratio         CULTURES      Physical Examination:    General: No acute distress.    PULM: Clear to auscultation bilaterally, no crackles or wheeizng  NECK: Supple, no lymphadenopathy, trachea midline  CVS: Regular rate and rhythm, no murmurs  ABD: Soft, nondistended, nontender, normoactive bowel sounds.   EXT: No edema, nontender. 3 JPs  SKIN: Warm and well perfused, pulse present DP b/l. pt asked me to examine her groin sites at a later time  NEURO: Alert, oriented, interactive, nonfocal    DEVICES:   3 MILAN   steele    RADIOLOGY:

## 2023-04-07 LAB
ANION GAP SERPL CALC-SCNC: 5 MMOL/L — SIGNIFICANT CHANGE UP (ref 5–17)
BASOPHILS # BLD AUTO: 0.05 K/UL — SIGNIFICANT CHANGE UP (ref 0–0.2)
BASOPHILS NFR BLD AUTO: 0.8 % — SIGNIFICANT CHANGE UP (ref 0–2)
BUN SERPL-MCNC: 11 MG/DL — SIGNIFICANT CHANGE UP (ref 7–23)
CALCIUM SERPL-MCNC: 8.7 MG/DL — SIGNIFICANT CHANGE UP (ref 8.5–10.1)
CHLORIDE SERPL-SCNC: 109 MMOL/L — HIGH (ref 96–108)
CO2 SERPL-SCNC: 25 MMOL/L — SIGNIFICANT CHANGE UP (ref 22–31)
CREAT SERPL-MCNC: 0.48 MG/DL — LOW (ref 0.5–1.3)
EGFR: 95 ML/MIN/1.73M2 — SIGNIFICANT CHANGE UP
EOSINOPHIL # BLD AUTO: 0.19 K/UL — SIGNIFICANT CHANGE UP (ref 0–0.5)
EOSINOPHIL NFR BLD AUTO: 2.9 % — SIGNIFICANT CHANGE UP (ref 0–6)
GLUCOSE SERPL-MCNC: 149 MG/DL — HIGH (ref 70–99)
HCT VFR BLD CALC: 32.3 % — LOW (ref 34.5–45)
HGB BLD-MCNC: 10.7 G/DL — LOW (ref 11.5–15.5)
IMM GRANULOCYTES NFR BLD AUTO: 0.3 % — SIGNIFICANT CHANGE UP (ref 0–0.9)
LYMPHOCYTES # BLD AUTO: 0.96 K/UL — LOW (ref 1–3.3)
LYMPHOCYTES # BLD AUTO: 14.8 % — SIGNIFICANT CHANGE UP (ref 13–44)
MAGNESIUM SERPL-MCNC: 2.2 MG/DL — SIGNIFICANT CHANGE UP (ref 1.6–2.6)
MCHC RBC-ENTMCNC: 29.6 PG — SIGNIFICANT CHANGE UP (ref 27–34)
MCHC RBC-ENTMCNC: 33.1 GM/DL — SIGNIFICANT CHANGE UP (ref 32–36)
MCV RBC AUTO: 89.2 FL — SIGNIFICANT CHANGE UP (ref 80–100)
MONOCYTES # BLD AUTO: 0.9 K/UL — SIGNIFICANT CHANGE UP (ref 0–0.9)
MONOCYTES NFR BLD AUTO: 13.8 % — SIGNIFICANT CHANGE UP (ref 2–14)
NEUTROPHILS # BLD AUTO: 4.38 K/UL — SIGNIFICANT CHANGE UP (ref 1.8–7.4)
NEUTROPHILS NFR BLD AUTO: 67.4 % — SIGNIFICANT CHANGE UP (ref 43–77)
PHOSPHATE SERPL-MCNC: 2.8 MG/DL — SIGNIFICANT CHANGE UP (ref 2.5–4.5)
PLATELET # BLD AUTO: 278 K/UL — SIGNIFICANT CHANGE UP (ref 150–400)
POTASSIUM SERPL-MCNC: 3.6 MMOL/L — SIGNIFICANT CHANGE UP (ref 3.5–5.3)
POTASSIUM SERPL-SCNC: 3.6 MMOL/L — SIGNIFICANT CHANGE UP (ref 3.5–5.3)
RBC # BLD: 3.62 M/UL — LOW (ref 3.8–5.2)
RBC # FLD: 13.6 % — SIGNIFICANT CHANGE UP (ref 10.3–14.5)
SODIUM SERPL-SCNC: 139 MMOL/L — SIGNIFICANT CHANGE UP (ref 135–145)
WBC # BLD: 6.5 K/UL — SIGNIFICANT CHANGE UP (ref 3.8–10.5)
WBC # FLD AUTO: 6.5 K/UL — SIGNIFICANT CHANGE UP (ref 3.8–10.5)

## 2023-04-07 RX ORDER — OXYCODONE HYDROCHLORIDE 5 MG/1
10 TABLET ORAL EVERY 6 HOURS
Refills: 0 | Status: DISCONTINUED | OUTPATIENT
Start: 2023-04-07 | End: 2023-04-10

## 2023-04-07 RX ORDER — KETOROLAC TROMETHAMINE 30 MG/ML
15 SYRINGE (ML) INJECTION EVERY 6 HOURS
Refills: 0 | Status: DISCONTINUED | OUTPATIENT
Start: 2023-04-07 | End: 2023-04-09

## 2023-04-07 RX ORDER — SODIUM,POTASSIUM PHOSPHATES 278-250MG
1 POWDER IN PACKET (EA) ORAL ONCE
Refills: 0 | Status: COMPLETED | OUTPATIENT
Start: 2023-04-07 | End: 2023-04-07

## 2023-04-07 RX ADMIN — Medication 1000 MILLIGRAM(S): at 21:26

## 2023-04-07 RX ADMIN — ENOXAPARIN SODIUM 40 MILLIGRAM(S): 100 INJECTION SUBCUTANEOUS at 11:32

## 2023-04-07 RX ADMIN — Medication 15 MILLIGRAM(S): at 18:47

## 2023-04-07 RX ADMIN — Medication 15 MILLIGRAM(S): at 23:00

## 2023-04-07 RX ADMIN — Medication 1000 MILLIGRAM(S): at 05:31

## 2023-04-07 RX ADMIN — OXYCODONE HYDROCHLORIDE 5 MILLIGRAM(S): 5 TABLET ORAL at 21:26

## 2023-04-07 RX ADMIN — OXYCODONE HYDROCHLORIDE 10 MILLIGRAM(S): 5 TABLET ORAL at 12:20

## 2023-04-07 RX ADMIN — Medication 15 MILLIGRAM(S): at 23:06

## 2023-04-07 RX ADMIN — Medication 1000 MILLIGRAM(S): at 15:25

## 2023-04-07 RX ADMIN — Medication 1 TABLET(S): at 11:32

## 2023-04-07 RX ADMIN — Medication 15 MILLIGRAM(S): at 18:38

## 2023-04-07 RX ADMIN — OXYCODONE HYDROCHLORIDE 10 MILLIGRAM(S): 5 TABLET ORAL at 11:22

## 2023-04-07 RX ADMIN — Medication 15 MILLIGRAM(S): at 11:23

## 2023-04-07 RX ADMIN — Medication 15 MILLIGRAM(S): at 11:40

## 2023-04-07 NOTE — PROGRESS NOTE ADULT - SUBJECTIVE AND OBJECTIVE BOX
82y Female POD 3 s/p Right pedicled gracilis muscle flap and complex closure of right groin. Doing well, pain controlled no issues over night.      Vital Signs Last 24 Hrs  T(C): 36.9 (07 Apr 2023 06:00), Max: 38.3 (06 Apr 2023 21:13)  T(F): 98.4 (07 Apr 2023 06:00), Max: 100.9 (06 Apr 2023 21:13)  HR: 59 (07 Apr 2023 06:00) (59 - 108)  BP: 116/48 (07 Apr 2023 06:00) (98/64 - 154/118)  BP(mean): 69 (07 Apr 2023 06:00) (62 - 130)  RR: 16 (07 Apr 2023 06:00) (10 - 25)  SpO2: 72% (07 Apr 2023 06:00) (72% - 99%)    Parameters below as of 06 Apr 2023 18:00  Patient On (Oxygen Delivery Method): room air    I&O's Detail    06 Apr 2023 07:01  -  07 Apr 2023 07:00  --------------------------------------------------------  IN:    IV PiggyBack: 100 mL  Total IN: 100 mL    OUT:    Bulb (mL): 15 mL    Bulb (mL): 80 mL    Bulb (mL): 20 mL    Indwelling Catheter - Urethral (mL): 1825 mL  Total OUT: 1940 mL    Total NET: -1840 mL                      PE: Gen- NAD, AAOx3  CVS- RRR  Chest- equal chest expansion  Abd- Soft, NT, ND    Focused: Dressing C/D/I, no palpable fluid collections, no surrounding erythema, discharge or signs of infection  Drains in place, serosanguinous drainage

## 2023-04-07 NOTE — PROGRESS NOTE ADULT - SUBJECTIVE AND OBJECTIVE BOX
MEDICATIONS  (STANDING):  acetaminophen   IVPB .. 1000 milliGRAM(s) IV Intermittent once  ceFAZolin  Injectable. 1000 milliGRAM(s) IV Push every 8 hours  enoxaparin Injectable 40 milliGRAM(s) SubCutaneous <User Schedule>  ondansetron Injectable 4 milliGRAM(s) IV Push once    MEDICATIONS  (PRN):  oxyCODONE    IR 5 milliGRAM(s) Oral every 6 hours PRN Moderate Pain (4 - 6)      PAST MEDICAL & SURGICAL HISTORY:  Ovarian cancer          Vital Signs Last 24 Hrs  T(C): 36.8 (06 Apr 2023 22:50), Max: 38.3 (06 Apr 2023 21:13)  T(F): 98.2 (06 Apr 2023 22:50), Max: 100.9 (06 Apr 2023 21:13)  HR: 80 (06 Apr 2023 23:00) (63 - 108)  BP: 98/64 (06 Apr 2023 22:00) (98/64 - 156/54)  BP(mean): 69 (06 Apr 2023 22:00) (64 - 130)  RR: 17 (06 Apr 2023 23:00) (12 - 25)  SpO2: 99% (06 Apr 2023 20:00) (96% - 99%)    Parameters below as of 06 Apr 2023 18:00  Patient On (Oxygen Delivery Method): room air        I&O's Summary    05 Apr 2023 07:01  -  06 Apr 2023 07:00  --------------------------------------------------------  IN: 100 mL / OUT: 2930 mL / NET: -2830 mL    06 Apr 2023 07:01  -  07 Apr 2023 00:19  --------------------------------------------------------  IN: 0 mL / OUT: 765 mL / NET: -765 mL                              11.3   8.02  )-----------( 290      ( 06 Apr 2023 05:57 )             34.0     04-06    140  |  108  |  11  ----------------------------<  155<H>  4.1   |  28  |  0.74    Ca    8.4<L>      06 Apr 2023 05:57  Phos  2.2     04-06  Mg     1.9     04-06                  Patient was seen and examined at the bedside this morning  No acute events overnight. Pain is controlled, states mild discomfort on the right groin and RLE heaviness  No nausea or vomiting. Tolerating diet     Physical Exam:  General: AAOx3, Well developed, NAD  Chest: Normal respiratory effort  Heart: RRR  Abdomen: Soft, NTND  Neuro/Psych: No localized deficits. Normal speech normal tone  Skin: Normal, no rashes, no lesions noted.   Extremities: right groin dressing in place, clean and intact, 2 ivania drains in place with serosanguinous   Left groin dressing in place, clean and intact, 1 MILAN drains in place with serosanguinous

## 2023-04-07 NOTE — PROGRESS NOTE ADULT - ASSESSMENT
82y Female POD 3 s/p Right pedicled gracilis muscle flap and complex closure of right groin. Doing well,     -care as per primary team  -HOB 0-45 degrees, no sitting upright in chair, can sit in recliner  - pain control  - monitor I/O's  - OOb/ambulate with assistance  -will take off dressing today, cover with dry gauze

## 2023-04-07 NOTE — PROGRESS NOTE ADULT - SUBJECTIVE AND OBJECTIVE BOX
INTERVAL HPI/OVERNIGHT EVENTS:  Patient S&E at bedside. No o/n events,   mild discomfort but feeling better  has steele in place to prevent infection  plastics to take off dressing today       PAST MEDICAL & SURGICAL HISTORY:  Ovarian cancer          FAMILY HISTORY:      VITAL SIGNS:  T(F): 97.6 (04-07-23 @ 10:41)  HR: 59 (04-07-23 @ 06:00)  BP: 116/48 (04-07-23 @ 06:00)  RR: 16 (04-07-23 @ 06:00)  SpO2: 72% (04-07-23 @ 06:00)  Wt(kg): --    PHYSICAL EXAM:  Constitutional: NAD  Respiratory: CTA b/l,  Cardiovascular: RRR,  Gastrointestinal: soft, NTND,  ; steele, drains in place  Neurological: AAOx3      MEDICATIONS  (STANDING):  acetaminophen   IVPB .. 1000 milliGRAM(s) IV Intermittent once  ceFAZolin  Injectable. 1000 milliGRAM(s) IV Push every 8 hours  enoxaparin Injectable 40 milliGRAM(s) SubCutaneous <User Schedule>  ketorolac   Injectable 15 milliGRAM(s) IV Push every 6 hours  ondansetron Injectable 4 milliGRAM(s) IV Push once  potassium phosphate / sodium phosphate Tablet (K-PHOS No. 2) 1 Tablet(s) Oral once    MEDICATIONS  (PRN):  oxyCODONE    IR 10 milliGRAM(s) Oral every 6 hours PRN Severe Pain (7 - 10)  oxyCODONE    IR 5 milliGRAM(s) Oral every 6 hours PRN Moderate Pain (4 - 6)      Allergies    penicillin (Unknown)    Intolerances        LABS:                        10.7   6.50  )-----------( 278      ( 07 Apr 2023 05:49 )             32.3     04-07    139  |  109<H>  |  11  ----------------------------<  149<H>  3.6   |  25  |  0.48<L>    Ca    8.7      07 Apr 2023 05:49  Phos  2.8     04-07  Mg     2.2     04-07            RADIOLOGY & ADDITIONAL TESTS:  Studies reviewed.

## 2023-04-07 NOTE — PROGRESS NOTE ADULT - ASSESSMENT
82 F with abscess of right groin and mass left groin, with remote history of ovarian cancer s/p PITA/BSO  POD#3 after Right groin exploration with excision of mass and gracilis flap, left groin excision of necrotic lymph node.    Plan:    Regular diet  Cefazolin completed today  cont pain control  pain control  HOB 0-45 degrees, no sitting upright in chair, can sit in recliner  Ambulation PT assessment  monitor drain output    Plan d/w Dr. Davis

## 2023-04-07 NOTE — PROGRESS NOTE ADULT - ASSESSMENT
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 - She is c/o right groin pain with open, draining, foul smelling purulent drainage now s/p Right groin exploration with excision of mass and gracilis flap, left groin excision of necrotic lymph node.    # h/o recurrent serous ovarian cancer   - pt last received doxil/avastin on 1/27/23  - doxil was dose reduced because of poor tolerance   - will need to restart outpt  once infection  under control     # right groin abscess  - 4/4/23 s/p radical dissection of groin and muscle flap as well as necrotic LN excision, pedicled right gracilis flap and complex closure of groin over mm flap 18 cm   - on cefazolin- to be completed today- tmax 100.9- if spikes again would reculture   - would continue with pain regimen   - tseele in place, plastics to take off dressing today and cover with dry gauze  - cefazolin completed today - monitor for further fevers      will continue to follow

## 2023-04-08 LAB
ANION GAP SERPL CALC-SCNC: 5 MMOL/L — SIGNIFICANT CHANGE UP (ref 5–17)
BASOPHILS # BLD AUTO: 0.06 K/UL — SIGNIFICANT CHANGE UP (ref 0–0.2)
BASOPHILS NFR BLD AUTO: 1.2 % — SIGNIFICANT CHANGE UP (ref 0–2)
BUN SERPL-MCNC: 17 MG/DL — SIGNIFICANT CHANGE UP (ref 7–23)
CALCIUM SERPL-MCNC: 8.6 MG/DL — SIGNIFICANT CHANGE UP (ref 8.5–10.1)
CHLORIDE SERPL-SCNC: 106 MMOL/L — SIGNIFICANT CHANGE UP (ref 96–108)
CO2 SERPL-SCNC: 26 MMOL/L — SIGNIFICANT CHANGE UP (ref 22–31)
CREAT SERPL-MCNC: 0.69 MG/DL — SIGNIFICANT CHANGE UP (ref 0.5–1.3)
EGFR: 87 ML/MIN/1.73M2 — SIGNIFICANT CHANGE UP
EOSINOPHIL # BLD AUTO: 0.33 K/UL — SIGNIFICANT CHANGE UP (ref 0–0.5)
EOSINOPHIL NFR BLD AUTO: 6.3 % — HIGH (ref 0–6)
GLUCOSE SERPL-MCNC: 158 MG/DL — HIGH (ref 70–99)
HCT VFR BLD CALC: 33 % — LOW (ref 34.5–45)
HGB BLD-MCNC: 10.8 G/DL — LOW (ref 11.5–15.5)
IMM GRANULOCYTES NFR BLD AUTO: 0.4 % — SIGNIFICANT CHANGE UP (ref 0–0.9)
LYMPHOCYTES # BLD AUTO: 0.87 K/UL — LOW (ref 1–3.3)
LYMPHOCYTES # BLD AUTO: 16.7 % — SIGNIFICANT CHANGE UP (ref 13–44)
MAGNESIUM SERPL-MCNC: 2 MG/DL — SIGNIFICANT CHANGE UP (ref 1.6–2.6)
MCHC RBC-ENTMCNC: 29.4 PG — SIGNIFICANT CHANGE UP (ref 27–34)
MCHC RBC-ENTMCNC: 32.7 GM/DL — SIGNIFICANT CHANGE UP (ref 32–36)
MCV RBC AUTO: 89.9 FL — SIGNIFICANT CHANGE UP (ref 80–100)
MONOCYTES # BLD AUTO: 0.58 K/UL — SIGNIFICANT CHANGE UP (ref 0–0.9)
MONOCYTES NFR BLD AUTO: 11.1 % — SIGNIFICANT CHANGE UP (ref 2–14)
NEUTROPHILS # BLD AUTO: 3.35 K/UL — SIGNIFICANT CHANGE UP (ref 1.8–7.4)
NEUTROPHILS NFR BLD AUTO: 64.3 % — SIGNIFICANT CHANGE UP (ref 43–77)
PHOSPHATE SERPL-MCNC: 3.1 MG/DL — SIGNIFICANT CHANGE UP (ref 2.5–4.5)
PLATELET # BLD AUTO: 335 K/UL — SIGNIFICANT CHANGE UP (ref 150–400)
POTASSIUM SERPL-MCNC: 3.5 MMOL/L — SIGNIFICANT CHANGE UP (ref 3.5–5.3)
POTASSIUM SERPL-SCNC: 3.5 MMOL/L — SIGNIFICANT CHANGE UP (ref 3.5–5.3)
RBC # BLD: 3.67 M/UL — LOW (ref 3.8–5.2)
RBC # FLD: 13.5 % — SIGNIFICANT CHANGE UP (ref 10.3–14.5)
SODIUM SERPL-SCNC: 137 MMOL/L — SIGNIFICANT CHANGE UP (ref 135–145)
WBC # BLD: 5.21 K/UL — SIGNIFICANT CHANGE UP (ref 3.8–10.5)
WBC # FLD AUTO: 5.21 K/UL — SIGNIFICANT CHANGE UP (ref 3.8–10.5)

## 2023-04-08 PROCEDURE — 99024 POSTOP FOLLOW-UP VISIT: CPT

## 2023-04-08 RX ORDER — POTASSIUM CHLORIDE 20 MEQ
40 PACKET (EA) ORAL ONCE
Refills: 0 | Status: COMPLETED | OUTPATIENT
Start: 2023-04-08 | End: 2023-04-08

## 2023-04-08 RX ORDER — SENNA PLUS 8.6 MG/1
2 TABLET ORAL AT BEDTIME
Refills: 0 | Status: DISCONTINUED | OUTPATIENT
Start: 2023-04-08 | End: 2023-04-10

## 2023-04-08 RX ADMIN — Medication 15 MILLIGRAM(S): at 11:13

## 2023-04-08 RX ADMIN — Medication 15 MILLIGRAM(S): at 23:11

## 2023-04-08 RX ADMIN — Medication 15 MILLIGRAM(S): at 11:43

## 2023-04-08 RX ADMIN — Medication 1000 MILLIGRAM(S): at 05:51

## 2023-04-08 RX ADMIN — Medication 15 MILLIGRAM(S): at 18:16

## 2023-04-08 RX ADMIN — Medication 40 MILLIEQUIVALENT(S): at 11:13

## 2023-04-08 RX ADMIN — ENOXAPARIN SODIUM 40 MILLIGRAM(S): 100 INJECTION SUBCUTANEOUS at 11:13

## 2023-04-08 RX ADMIN — Medication 15 MILLIGRAM(S): at 05:51

## 2023-04-08 NOTE — PROGRESS NOTE ADULT - ASSESSMENT
82 F with abscess of right groin and mass left groin, with remote history of ovarian cancer s/p PITA/BSO  POD#4 after Right groin exploration with excision of mass and gracilis flap, left groin excision of necrotic lymph node.    Plan:    Regular diet  cont pain control  pain control  HOB 0-45 degrees, no sitting upright in chair, can sit in recliner  Ambulation PT assessment: EMILY  monitor drain output  Plastic surg reccs:     HOB 0-45 degrees, no sitting upright in chair, can sit in recliner, no weight bearing restrcitions  - pain control  - monitor I/O's  - OOb/ambulate with assistance  -cover incisions with dry gauze    Plan d/w Dr. Rankin

## 2023-04-08 NOTE — PROGRESS NOTE ADULT - SUBJECTIVE AND OBJECTIVE BOX
INTERVAL HPI/OVERNIGHT EVENTS:  Patient S&E at bedside. No o/n events,   pt has been afebrile for past 24 hrs, VSS   Labs reviewed, WBC 5.2, Hb 10.8, plt 335     PAST MEDICAL & SURGICAL HISTORY:  Ovarian cancer          FAMILY HISTORY:      VITAL SIGNS:  T(F): 97.8 (04-08-23 @ 08:10)  HR: 62 (04-08-23 @ 08:10)  BP: 117/57 (04-08-23 @ 08:10)  RR: 18 (04-08-23 @ 08:10)  SpO2: 98% (04-08-23 @ 08:10)  Wt(kg): --    PHYSICAL EXAM:  Constitutional: NAD  Respiratory: CTA b/l,  Cardiovascular: RRR,  Gastrointestinal: soft, NTND,  ; steele, drains in place  Neurological: AAOx3      MEDICATIONS  (STANDING):  acetaminophen   IVPB .. 1000 milliGRAM(s) IV Intermittent once  ceFAZolin  Injectable. 1000 milliGRAM(s) IV Push every 8 hours  enoxaparin Injectable 40 milliGRAM(s) SubCutaneous <User Schedule>  ketorolac   Injectable 15 milliGRAM(s) IV Push every 6 hours  ondansetron Injectable 4 milliGRAM(s) IV Push once  potassium chloride    Tablet ER 40 milliEquivalent(s) Oral once    MEDICATIONS  (PRN):  oxyCODONE    IR 10 milliGRAM(s) Oral every 6 hours PRN Severe Pain (7 - 10)  oxyCODONE    IR 5 milliGRAM(s) Oral every 6 hours PRN Moderate Pain (4 - 6)      Allergies    penicillin (Unknown)    Intolerances        LABS:                        10.8   5.21  )-----------( 335      ( 08 Apr 2023 07:44 )             33.0     04-08    137  |  106  |  17  ----------------------------<  158<H>  3.5   |  26  |  0.69    Ca    8.6      08 Apr 2023 07:44  Phos  3.1     04-08  Mg     2.0     04-08            RADIOLOGY & ADDITIONAL TESTS:  Studies reviewed.

## 2023-04-08 NOTE — PROGRESS NOTE ADULT - SUBJECTIVE AND OBJECTIVE BOX
82y Female POD 4 s/p Right pedicled gracilis muscle flap and complex closure of right groin. Doing well, pain controlled no issues over night, down graded to floor.        Vital Signs Last 24 Hrs  T(C): 37.1 (08 Apr 2023 04:14), Max: 37.1 (07 Apr 2023 21:03)  T(F): 98.7 (08 Apr 2023 04:14), Max: 98.7 (07 Apr 2023 21:03)  HR: 70 (08 Apr 2023 04:14) (62 - 87)  BP: 130/56 (08 Apr 2023 04:14) (108/67 - 137/57)  BP(mean): 84 (07 Apr 2023 20:00) (63 - 86)  RR: 18 (08 Apr 2023 04:14) (16 - 20)  SpO2: 100% (08 Apr 2023 04:14) (97% - 100%)    Parameters below as of 08 Apr 2023 04:14  Patient On (Oxygen Delivery Method): room air    I&O's Detail    06 Apr 2023 07:01  -  07 Apr 2023 07:00  --------------------------------------------------------  IN:    IV PiggyBack: 100 mL  Total IN: 100 mL    OUT:    Bulb (mL): 15 mL    Bulb (mL): 80 mL    Bulb (mL): 20 mL    Indwelling Catheter - Urethral (mL): 1825 mL  Total OUT: 1940 mL    Total NET: -1840 mL      07 Apr 2023 07:01  -  08 Apr 2023 06:43  --------------------------------------------------------  IN:  Total IN: 0 mL    OUT:    Bulb (mL): 35 mL    Bulb (mL): 40 mL    Bulb (mL): 110 mL    Voided (mL): 150 mL  Total OUT: 335 mL    Total NET: -335 mL      PE: Gen- NAD, AAOx3  CVS- RRR  Chest- equal chest expansion  Abd- Soft, NT, ND    Focused: Incision C/D/I, no palpable fluid collections, no surrounding erythema, discharge or signs of infection  Drains in place, serosanguinous drainage

## 2023-04-08 NOTE — PROGRESS NOTE ADULT - SUBJECTIVE AND OBJECTIVE BOX
Patient was seen and examined at the bedside this morning  No acute events overnight. Pain is controlled, states mild discomfort on both groins  No nausea or vomiting. Tolerating diet     Physical Exam:  General: AAOx3, Well developed, NAD  Chest: Normal respiratory effort  Heart: RRR  Abdomen: Soft, NTND  Neuro/Psych: No localized deficits. Normal speech normal tone  Skin: Normal, no rashes, no lesions noted.   Extremities: right groin dressing in place, clean and intact, 2 ivania drains in place with serosanguinous   Left groin dressing in place, clean and intact, 1 MILAN drains in place with serous     Vitals:  T(C): 36.6 ( @ 08:10), Max: 37.1 ( @ 21:03)  HR: 62 ( @ 08:10) (62 - 87)  BP: 117/57 ( @ 08:10) (108/67 - 137/57)  RR: 18 ( @ 08:10) (18 - 20)  SpO2: 98% ( @ 08:10) (97% - 100%)     @ 07:01  -   @ 07:00  --------------------------------------------------------  IN:  Total IN: 0 mL    OUT:    Bulb (mL): 35 mL    Bulb (mL): 40 mL    Bulb (mL): 110 mL    Voided (mL): 150 mL  Total OUT: 335 mL    Total NET: -335 mL       @ 07:44                    10.8  CBC: 5.21>)-------(<335                     33.0                 137 | 106 | 17    CMP:  ----------------------< 158               3.5 | 26 | 0.69                      Ca:8.6  Phos:3.1  M.0               -|      |-        LFTs:  ------|-|-----             -|      |-   @ 05:49                    10.7  CBC: 6.50>)-------(<278                     32.3                 139 | 109 | 11    CMP:  ----------------------< 149               3.6 | 25 | 0.48                      Ca:8.7  Phos:2.8  M.2               -|      |-        LFTs:  ------|-|-----             -|      |-      Current Inpatient Medications:  acetaminophen   IVPB .. 1000 milliGRAM(s) IV Intermittent once  ceFAZolin  Injectable. 1000 milliGRAM(s) IV Push every 8 hours  enoxaparin Injectable 40 milliGRAM(s) SubCutaneous <User Schedule>  ketorolac   Injectable 15 milliGRAM(s) IV Push every 6 hours  ondansetron Injectable 4 milliGRAM(s) IV Push once  oxyCODONE    IR 10 milliGRAM(s) Oral every 6 hours PRN  oxyCODONE    IR 5 milliGRAM(s) Oral every 6 hours PRN  potassium chloride    Tablet ER 40 milliEquivalent(s) Oral once

## 2023-04-08 NOTE — PROGRESS NOTE ADULT - ASSESSMENT
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 - She is c/o right groin pain with open, draining, foul smelling purulent drainage now s/p Right groin exploration with excision of mass and gracilis flap, left groin excision of necrotic lymph node.    # h/o recurrent serous ovarian cancer   - pt last received doxil/avastin on 1/27/23  - doxil was dose reduced because of poor tolerance   - will need to restart outpt  once infection  under control     # right groin abscess  - 4/4/23 s/p radical dissection of groin and muscle flap as well as necrotic LN excision, pedicled right gracilis flap and complex closure of groin over mm flap 18 cm   - on cefazolin- afebrile past 24 hrs  - would continue with pain regimen   - steele in place, plastics to take off dressing today and cover with dry gauze      will continue to follow

## 2023-04-08 NOTE — PROGRESS NOTE ADULT - ASSESSMENT
82y Female POD 4 s/p Right pedicled gracilis muscle flap and complex closure of right groin. Doing well,     -care as per primary team  -HOB 0-45 degrees, no sitting upright in chair, can sit in recliner, no weight bearing restrcitions  - pain control  - monitor I/O's  - OOb/ambulate with assistance  -cover incisions with dry gauze

## 2023-04-09 LAB
ANION GAP SERPL CALC-SCNC: 3 MMOL/L — LOW (ref 5–17)
BASOPHILS # BLD AUTO: 0.07 K/UL — SIGNIFICANT CHANGE UP (ref 0–0.2)
BASOPHILS NFR BLD AUTO: 1.7 % — SIGNIFICANT CHANGE UP (ref 0–2)
BUN SERPL-MCNC: 20 MG/DL — SIGNIFICANT CHANGE UP (ref 7–23)
CALCIUM SERPL-MCNC: 8.8 MG/DL — SIGNIFICANT CHANGE UP (ref 8.5–10.1)
CHLORIDE SERPL-SCNC: 108 MMOL/L — SIGNIFICANT CHANGE UP (ref 96–108)
CO2 SERPL-SCNC: 24 MMOL/L — SIGNIFICANT CHANGE UP (ref 22–31)
CREAT SERPL-MCNC: 0.55 MG/DL — SIGNIFICANT CHANGE UP (ref 0.5–1.3)
EGFR: 92 ML/MIN/1.73M2 — SIGNIFICANT CHANGE UP
EOSINOPHIL # BLD AUTO: 0.35 K/UL — SIGNIFICANT CHANGE UP (ref 0–0.5)
EOSINOPHIL NFR BLD AUTO: 8.4 % — HIGH (ref 0–6)
GLUCOSE SERPL-MCNC: 145 MG/DL — HIGH (ref 70–99)
HCT VFR BLD CALC: 30.9 % — LOW (ref 34.5–45)
HGB BLD-MCNC: 10.2 G/DL — LOW (ref 11.5–15.5)
IMM GRANULOCYTES NFR BLD AUTO: 0.5 % — SIGNIFICANT CHANGE UP (ref 0–0.9)
LYMPHOCYTES # BLD AUTO: 0.75 K/UL — LOW (ref 1–3.3)
LYMPHOCYTES # BLD AUTO: 18 % — SIGNIFICANT CHANGE UP (ref 13–44)
MAGNESIUM SERPL-MCNC: 2.2 MG/DL — SIGNIFICANT CHANGE UP (ref 1.6–2.6)
MCHC RBC-ENTMCNC: 29.6 PG — SIGNIFICANT CHANGE UP (ref 27–34)
MCHC RBC-ENTMCNC: 33 GM/DL — SIGNIFICANT CHANGE UP (ref 32–36)
MCV RBC AUTO: 89.6 FL — SIGNIFICANT CHANGE UP (ref 80–100)
MONOCYTES # BLD AUTO: 0.49 K/UL — SIGNIFICANT CHANGE UP (ref 0–0.9)
MONOCYTES NFR BLD AUTO: 11.8 % — SIGNIFICANT CHANGE UP (ref 2–14)
NEUTROPHILS # BLD AUTO: 2.48 K/UL — SIGNIFICANT CHANGE UP (ref 1.8–7.4)
NEUTROPHILS NFR BLD AUTO: 59.6 % — SIGNIFICANT CHANGE UP (ref 43–77)
PHOSPHATE SERPL-MCNC: 3.9 MG/DL — SIGNIFICANT CHANGE UP (ref 2.5–4.5)
PLATELET # BLD AUTO: 328 K/UL — SIGNIFICANT CHANGE UP (ref 150–400)
POTASSIUM SERPL-MCNC: 4.2 MMOL/L — SIGNIFICANT CHANGE UP (ref 3.5–5.3)
POTASSIUM SERPL-SCNC: 4.2 MMOL/L — SIGNIFICANT CHANGE UP (ref 3.5–5.3)
RBC # BLD: 3.45 M/UL — LOW (ref 3.8–5.2)
RBC # FLD: 13.3 % — SIGNIFICANT CHANGE UP (ref 10.3–14.5)
SODIUM SERPL-SCNC: 135 MMOL/L — SIGNIFICANT CHANGE UP (ref 135–145)
WBC # BLD: 4.16 K/UL — SIGNIFICANT CHANGE UP (ref 3.8–10.5)
WBC # FLD AUTO: 4.16 K/UL — SIGNIFICANT CHANGE UP (ref 3.8–10.5)

## 2023-04-09 PROCEDURE — 99024 POSTOP FOLLOW-UP VISIT: CPT

## 2023-04-09 RX ORDER — POLYETHYLENE GLYCOL 3350 17 G/17G
17 POWDER, FOR SOLUTION ORAL DAILY
Refills: 0 | Status: DISCONTINUED | OUTPATIENT
Start: 2023-04-09 | End: 2023-04-10

## 2023-04-09 RX ADMIN — POLYETHYLENE GLYCOL 3350 17 GRAM(S): 17 POWDER, FOR SOLUTION ORAL at 12:25

## 2023-04-09 RX ADMIN — ENOXAPARIN SODIUM 40 MILLIGRAM(S): 100 INJECTION SUBCUTANEOUS at 09:45

## 2023-04-09 NOTE — PROGRESS NOTE ADULT - ASSESSMENT
82 F with abscess of right groin and mass left groin, with remote history of ovarian cancer s/p PITA/BSO  POD#5 after Right groin exploration with excision of mass and gracilis flap, left groin excision of necrotic lymph node.  Pt ambulating, pain cotrolled, voiding freely  Plan:    Regular diet  cont pain control  pain control  HOB 0-45 degrees, no sitting upright in chair, can sit in recliner  Ambulation PT assessment: EMILY  monitor drain output  Plastic surg reccs:     HOB 0-45 degrees, no sitting upright in chair, can sit in recliner, no weight bearing restrcitions  - pain control  - monitor I/O's  - OOb/ambulate with assistance  -cover incisions with dry gauze    Plan d/w Dr. Huang

## 2023-04-09 NOTE — PROGRESS NOTE ADULT - ASSESSMENT
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 - She is c/o right groin pain with open, draining, foul smelling purulent drainage now s/p Right groin exploration with excision of mass and gracilis flap, left groin excision of necrotic lymph node.    # h/o recurrent serous ovarian cancer   - pt last received doxil/avastin on 1/27/23  - doxil was dose reduced because of poor tolerance   - will need to restart outpt  once infection  under control     # right groin abscess  - 4/4/23 s/p radical dissection of groin and muscle flap as well as necrotic LN excision, pedicled right gracilis flap and complex closure of groin over mm flap 18 cm   - completed cefazolin- afebrile past 24 hrs  - dc today with instructions on proper wound care     will f/u with [facility] on dc

## 2023-04-09 NOTE — PROGRESS NOTE ADULT - SUBJECTIVE AND OBJECTIVE BOX
Patient was seen and examined at the bedside this morning  No acute events overnight. Pain is controlled, states mild discomfort on both groins  No nausea or vomiting. Tolerating diet     Physical Exam:  General: AAOx3, Well developed, NAD  Chest: Normal respiratory effort  Heart: RRR  Abdomen: Soft, NTND  Neuro/Psych: No localized deficits. Normal speech normal tone  Skin: Normal, no rashes, no lesions noted.   Extremities: right groin dressing in place, clean and intact, 2 ivania drains in place with serosanguinous   Left groin dressing in place, clean and intact, 1 MILAN drains in place with serous     Vitals:  T(C): 37 ( @ 00:05), Max: 37 ( @ 00:05)  HR: 66 ( @ 00:05) (62 - 80)  BP: 142/60 ( @ 00:05) (117/57 - 142/60)  RR: 18 ( @ 00:05) (18 - 18)  SpO2: 97% ( @ 00:05) (97% - 99%)     @ 07:01  -   @ 07:00  --------------------------------------------------------  IN:  Total IN: 0 mL    OUT:    Bulb (mL): 35 mL    Bulb (mL): 40 mL    Bulb (mL): 110 mL    Voided (mL): 150 mL  Total OUT: 335 mL    Total NET: -335 mL       @ 07:01  -   @ 04:42  --------------------------------------------------------  IN:  Total IN: 0 mL    OUT:    Bulb (mL): 30 mL    Bulb (mL): 72 mL    Bulb (mL): 155 mL  Total OUT: 257 mL    Total NET: -257 mL       @ 07:44                    10.8  CBC: 5.21>)-------(<335                     33.0                 137 | 106 | 17    CMP:  ----------------------< 158               3.5 | 26 | 0.69                      Ca:8.6  Phos:3.1  M.0               -|      |-        LFTs:  ------|-|-----             -|      |-      Current Inpatient Medications:  acetaminophen   IVPB .. 1000 milliGRAM(s) IV Intermittent once  enoxaparin Injectable 40 milliGRAM(s) SubCutaneous <User Schedule>  ketorolac   Injectable 15 milliGRAM(s) IV Push every 6 hours  ondansetron Injectable 4 milliGRAM(s) IV Push once  oxyCODONE    IR 5 milliGRAM(s) Oral every 6 hours PRN  oxyCODONE    IR 10 milliGRAM(s) Oral every 6 hours PRN  senna 2 Tablet(s) Oral at bedtime PRN

## 2023-04-09 NOTE — PROGRESS NOTE ADULT - SUBJECTIVE AND OBJECTIVE BOX
INTERVAL HPI/OVERNIGHT EVENTS:  Patient S&E at bedside. No o/n events,   pt states that she did not sleep last night   otherwise, no complaints or pain, wants to go home   afebrile, vss     PAST MEDICAL & SURGICAL HISTORY:  Ovarian cancer          FAMILY HISTORY:      VITAL SIGNS:  T(F): 97.6 (04-09-23 @ 08:48)  HR: 64 (04-09-23 @ 08:48)  BP: 151/64 (04-09-23 @ 08:48)  RR: 18 (04-09-23 @ 08:48)  SpO2: 98% (04-09-23 @ 08:48)  Wt(kg): --    PHYSICAL EXAM:  Constitutional: NAD  Respiratory: CTA b/l,  Cardiovascular: RRR,  Gastrointestinal: soft, NTND,  ; steele, drains in place  Neurological: AAOx3        MEDICATIONS  (STANDING):  acetaminophen   IVPB .. 1000 milliGRAM(s) IV Intermittent once  enoxaparin Injectable 40 milliGRAM(s) SubCutaneous <User Schedule>  ondansetron Injectable 4 milliGRAM(s) IV Push once    MEDICATIONS  (PRN):  oxyCODONE    IR 5 milliGRAM(s) Oral every 6 hours PRN Moderate Pain (4 - 6)  oxyCODONE    IR 10 milliGRAM(s) Oral every 6 hours PRN Severe Pain (7 - 10)  senna 2 Tablet(s) Oral at bedtime PRN Constipation      Allergies    penicillin (Unknown)    Intolerances        LABS:                        10.2   4.16  )-----------( 328      ( 09 Apr 2023 06:32 )             30.9     04-09    135  |  108  |  20  ----------------------------<  145<H>  4.2   |  24  |  0.55    Ca    8.8      09 Apr 2023 06:32  Phos  3.9     04-09  Mg     2.2     04-09            RADIOLOGY & ADDITIONAL TESTS:  Studies reviewed.

## 2023-04-10 ENCOUNTER — TRANSCRIPTION ENCOUNTER (OUTPATIENT)
Age: 83
End: 2023-04-10

## 2023-04-10 VITALS
HEART RATE: 101 BPM | DIASTOLIC BLOOD PRESSURE: 69 MMHG | RESPIRATION RATE: 18 BRPM | TEMPERATURE: 98 F | OXYGEN SATURATION: 100 % | SYSTOLIC BLOOD PRESSURE: 119 MMHG

## 2023-04-10 LAB
ANION GAP SERPL CALC-SCNC: 4 MMOL/L — LOW (ref 5–17)
BASOPHILS # BLD AUTO: 0.06 K/UL — SIGNIFICANT CHANGE UP (ref 0–0.2)
BASOPHILS NFR BLD AUTO: 1.4 % — SIGNIFICANT CHANGE UP (ref 0–2)
BUN SERPL-MCNC: 16 MG/DL — SIGNIFICANT CHANGE UP (ref 7–23)
CALCIUM SERPL-MCNC: 9 MG/DL — SIGNIFICANT CHANGE UP (ref 8.5–10.1)
CHLORIDE SERPL-SCNC: 109 MMOL/L — HIGH (ref 96–108)
CO2 SERPL-SCNC: 25 MMOL/L — SIGNIFICANT CHANGE UP (ref 22–31)
CREAT SERPL-MCNC: 0.63 MG/DL — SIGNIFICANT CHANGE UP (ref 0.5–1.3)
EGFR: 89 ML/MIN/1.73M2 — SIGNIFICANT CHANGE UP
EOSINOPHIL # BLD AUTO: 0.37 K/UL — SIGNIFICANT CHANGE UP (ref 0–0.5)
EOSINOPHIL NFR BLD AUTO: 8.6 % — HIGH (ref 0–6)
GLUCOSE SERPL-MCNC: 146 MG/DL — HIGH (ref 70–99)
HCT VFR BLD CALC: 31.4 % — LOW (ref 34.5–45)
HGB BLD-MCNC: 10.4 G/DL — LOW (ref 11.5–15.5)
IMM GRANULOCYTES NFR BLD AUTO: 0.5 % — SIGNIFICANT CHANGE UP (ref 0–0.9)
LYMPHOCYTES # BLD AUTO: 0.86 K/UL — LOW (ref 1–3.3)
LYMPHOCYTES # BLD AUTO: 20.1 % — SIGNIFICANT CHANGE UP (ref 13–44)
MAGNESIUM SERPL-MCNC: 2.1 MG/DL — SIGNIFICANT CHANGE UP (ref 1.6–2.6)
MCHC RBC-ENTMCNC: 29.8 PG — SIGNIFICANT CHANGE UP (ref 27–34)
MCHC RBC-ENTMCNC: 33.1 GM/DL — SIGNIFICANT CHANGE UP (ref 32–36)
MCV RBC AUTO: 90 FL — SIGNIFICANT CHANGE UP (ref 80–100)
MONOCYTES # BLD AUTO: 0.61 K/UL — SIGNIFICANT CHANGE UP (ref 0–0.9)
MONOCYTES NFR BLD AUTO: 14.3 % — HIGH (ref 2–14)
NEUTROPHILS # BLD AUTO: 2.36 K/UL — SIGNIFICANT CHANGE UP (ref 1.8–7.4)
NEUTROPHILS NFR BLD AUTO: 55.1 % — SIGNIFICANT CHANGE UP (ref 43–77)
PHOSPHATE SERPL-MCNC: 3.5 MG/DL — SIGNIFICANT CHANGE UP (ref 2.5–4.5)
PLATELET # BLD AUTO: 362 K/UL — SIGNIFICANT CHANGE UP (ref 150–400)
POTASSIUM SERPL-MCNC: 4.4 MMOL/L — SIGNIFICANT CHANGE UP (ref 3.5–5.3)
POTASSIUM SERPL-SCNC: 4.4 MMOL/L — SIGNIFICANT CHANGE UP (ref 3.5–5.3)
RBC # BLD: 3.49 M/UL — LOW (ref 3.8–5.2)
RBC # FLD: 13.4 % — SIGNIFICANT CHANGE UP (ref 10.3–14.5)
SODIUM SERPL-SCNC: 138 MMOL/L — SIGNIFICANT CHANGE UP (ref 135–145)
SURGICAL PATHOLOGY STUDY: SIGNIFICANT CHANGE UP
WBC # BLD: 4.28 K/UL — SIGNIFICANT CHANGE UP (ref 3.8–10.5)
WBC # FLD AUTO: 4.28 K/UL — SIGNIFICANT CHANGE UP (ref 3.8–10.5)

## 2023-04-10 RX ORDER — ACETAMINOPHEN 500 MG
2 TABLET ORAL
Qty: 42 | Refills: 0
Start: 2023-04-10 | End: 2023-04-16

## 2023-04-10 RX ADMIN — ENOXAPARIN SODIUM 40 MILLIGRAM(S): 100 INJECTION SUBCUTANEOUS at 10:23

## 2023-04-10 RX ADMIN — POLYETHYLENE GLYCOL 3350 17 GRAM(S): 17 POWDER, FOR SOLUTION ORAL at 10:23

## 2023-04-10 NOTE — PROGRESS NOTE ADULT - ASSESSMENT
82 F with abscess of right groin and mass left groin, with remote history of ovarian cancer s/p PITA/BSO  POD#6 after Right groin exploration with excision of mass and gracilis flap, left groin excision of necrotic lymph node.  Pt ambulating, pain cotrolled, voiding freely    Plan:  Regular diet  cont pain control  pain control  monitor drain output  follow up w/ plastic surgery for discharge planning  HOB 0-45 degrees, no sitting upright in chair, can sit in recliner, no weight bearing restrcitions  Dispo: [facility]ILY but patient would like to go home with home assistance. Will discuss with case management    Plan d/w Dr. Huangc

## 2023-04-10 NOTE — PROVIDER CONTACT NOTE (OTHER) - SITUATION
is aware that patient is here, he sent paid to .  Please fax discharge papers to [phone].
Patient called c/o left thigh MILAN drain leaking.

## 2023-04-10 NOTE — DISCHARGE NOTE PROVIDER - HOSPITAL COURSE
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 - She is c/o right groin pain with open, draining, foul smelling purulent drainage   now s/p Right groin exploration with excision of mass and gracilis flap, left groin excision of necrotic lymph node.    # h/o recurrent serous ovarian cancer   - pt last received doxil/avastin on 1/27/23  - will restart outpt once infection under control     # right groin abscess  - 4/4/23 s/p radical dissection of groin and muscle flap as well as necrotic LN excision, pedicled right gracilis flap and complex closure of groin over mm flap 18 cm   - completed cefazolin- afebrile past 24 hrs  - pt concerned about drain remaining in place on dc- to discuss with plastics if can be removed - minimal output on the left  - dc today with instructions on proper wound care and VNS services if cleared by plastics    will f/u with msk on dc

## 2023-04-10 NOTE — DISCHARGE NOTE NURSING/CASE MANAGEMENT/SOCIAL WORK - PATIENT PORTAL LINK FT
You can access the FollowMyHealth Patient Portal offered by [facility] by registering at the following website: [email or web address]. By joining [employer]’s FollowMyHealth portal, you will also be able to view your health information using other applications (apps) compatible with our system.

## 2023-04-10 NOTE — PROGRESS NOTE ADULT - ASSESSMENT
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 - She is c/o right groin pain with open, draining, foul smelling purulent drainage now s/p Right groin exploration with excision of mass and gracilis flap, left groin excision of necrotic lymph node.    # h/o recurrent serous ovarian cancer   - pt last received doxil/avastin on 1/27/23  - will plan to restart outpt  once infection  under control     # right groin abscess  - 4/4/23 s/p radical dissection of groin and muscle flap as well as necrotic LN excision, pedicled right gracilis flap and complex closure of groin over mm flap 18 cm   - completed cefazolin- afebrile past 24 hrs  - pt concerned about drain remaining in place on dc- to discuss with plastics if can be removed - minimal outpiut   - dc today with instructions on proper wound care if cleared by plastics    will f/u with msk on dc

## 2023-04-10 NOTE — DISCHARGE NOTE PROVIDER - NSDCFUADDINST_GEN_ALL_CORE_FT
Can walk with flexed upper body. Monitor MILAN drain output, empty daily and record in a journal. Follow up in clinic in 1 week to access for drain removal.

## 2023-04-10 NOTE — DISCHARGE NOTE PROVIDER - NSDCCPCAREPLAN_GEN_ALL_CORE_FT
PRINCIPAL DISCHARGE DIAGNOSIS  Diagnosis: Ovarian cancer  Assessment and Plan of Treatment:       SECONDARY DISCHARGE DIAGNOSES  Diagnosis: Colon cancer  Assessment and Plan of Treatment:     Diagnosis: HLD (hyperlipidemia)  Assessment and Plan of Treatment:

## 2023-04-10 NOTE — DISCHARGE NOTE PROVIDER - CARE PROVIDER_API CALL
Fernando Johnston)  Surgery  [address] [place] [address]  Phone: [phone]  Fax: [phone]  Follow Up Time: 1 week    Keon Davis)  Surgery  [address] [place] [address]  Phone: [phone]  Fax: [phone]  Follow Up Time: 1 week

## 2023-04-10 NOTE — PROGRESS NOTE ADULT - SUBJECTIVE AND OBJECTIVE BOX
SURGERY DAILY PROGRESS NOTE:     Subjective:  Patient seen and examined this AM at bedside. No acute events overnight and patient resting comfortably. Tolerating diet, pain well controlled. Denies fever/chills, shortness of breath, chest pain. VS reviewed    Objective:    MEDICATIONS  (STANDING):  acetaminophen   IVPB .. 1000 milliGRAM(s) IV Intermittent once  enoxaparin Injectable 40 milliGRAM(s) SubCutaneous <User Schedule>  ondansetron Injectable 4 milliGRAM(s) IV Push once  polyethylene glycol 3350 17 Gram(s) Oral daily    MEDICATIONS  (PRN):  oxyCODONE    IR 5 milliGRAM(s) Oral every 6 hours PRN Moderate Pain (4 - 6)  oxyCODONE    IR 10 milliGRAM(s) Oral every 6 hours PRN Severe Pain (7 - 10)  senna 2 Tablet(s) Oral at bedtime PRN Constipation      Vital Signs Last 24 Hrs  T(C): 36.9 (09 Apr 2023 20:48), Max: 36.9 (09 Apr 2023 20:48)  T(F): 98.4 (09 Apr 2023 20:48), Max: 98.4 (09 Apr 2023 20:48)  HR: 68 (09 Apr 2023 20:48) (64 - 72)  BP: 142/70 (09 Apr 2023 20:48) (142/70 - 151/64)  BP(mean): --  RR: 18 (09 Apr 2023 20:48) (18 - 18)  SpO2: 98% (09 Apr 2023 20:48) (98% - 98%)    Parameters below as of 09 Apr 2023 20:48  Patient On (Oxygen Delivery Method): room air          Physical Exam:  General: AAOx3, Well developed, NAD  Chest: Normal respiratory effort  Heart: RRR  Abdomen: Soft, NTND  Neuro/Psych: No localized deficits. Normal speech normal tone  Skin: Normal, no rashes, no lesions noted.   Extremities: right groin dressing in place, clean and intact, 2 ivania drains in place with serosanguinous   Left groin dressing in place, clean and intact, 1 MILAN drains in place with serous       I&O's Detail    08 Apr 2023 07:01  -  09 Apr 2023 07:00  --------------------------------------------------------  IN:  Total IN: 0 mL    OUT:    Bulb (mL): 97 mL    Bulb (mL): 235 mL    Bulb (mL): 40 mL  Total OUT: 372 mL    Total NET: -372 mL      09 Apr 2023 07:01  -  10 Apr 2023 06:16  --------------------------------------------------------  IN:  Total IN: 0 mL    OUT:    Bulb (mL): 20 mL    Bulb (mL): 160 mL    Bulb (mL): 100 mL  Total OUT: 280 mL    Total NET: -280 mL          Daily     Daily     LABS:                        10.2   4.16  )-----------( 328      ( 09 Apr 2023 06:32 )             30.9     04-09    135  |  108  |  20  ----------------------------<  145<H>  4.2   |  24  |  0.55    Ca    8.8      09 Apr 2023 06:32  Phos  3.9     04-09  Mg     2.2     04-09            RADIOLOGY & ADDITIONAL STUDIES:

## 2023-04-10 NOTE — DISCHARGE NOTE PROVIDER - PROVIDER TOKENS
PROVIDER:[TOKEN:[[id number]],FOLLOWUP:[1 week]],PROVIDER:[TOKEN:[[id number]],FOLLOWUP:[1 week]]

## 2023-04-10 NOTE — PROGRESS NOTE ADULT - SUBJECTIVE AND OBJECTIVE BOX
INTERVAL HPI/OVERNIGHT EVENTS:  Patient S&E at bedside. No o/n events,   pt states that she is ready to go home but is concerned about drain she has in place  awaiting clearance from plastic surgery team   seen at bedside with surgery   afebrile, vss on RA, labs reviewed, Hb stable     PAST MEDICAL & SURGICAL HISTORY:  Ovarian cancer          FAMILY HISTORY:      VITAL SIGNS:  T(F): 98.4 (04-09-23 @ 20:48)  HR: 68 (04-09-23 @ 20:48)  BP: 142/70 (04-09-23 @ 20:48)  RR: 18 (04-09-23 @ 20:48)  SpO2: 98% (04-09-23 @ 20:48)  Wt(kg): --    PHYSICAL EXAM:  Constitutional: NAD  Respiratory: CTA b/l,  Cardiovascular: RRR,  Gastrointestinal: soft, NTND,  ; steele, drains in place  Neurological: AAOx3      MEDICATIONS  (STANDING):  acetaminophen   IVPB .. 1000 milliGRAM(s) IV Intermittent once  enoxaparin Injectable 40 milliGRAM(s) SubCutaneous <User Schedule>  ondansetron Injectable 4 milliGRAM(s) IV Push once  polyethylene glycol 3350 17 Gram(s) Oral daily    MEDICATIONS  (PRN):  oxyCODONE    IR 5 milliGRAM(s) Oral every 6 hours PRN Moderate Pain (4 - 6)  oxyCODONE    IR 10 milliGRAM(s) Oral every 6 hours PRN Severe Pain (7 - 10)  senna 2 Tablet(s) Oral at bedtime PRN Constipation      Allergies    penicillin (Unknown)    Intolerances        LABS:                        10.4   4.28  )-----------( 362      ( 10 Apr 2023 06:37 )             31.4     04-09    135  |  108  |  20  ----------------------------<  145<H>  4.2   |  24  |  0.55    Ca    8.8      09 Apr 2023 06:32  Phos  3.9     04-09  Mg     2.2     04-09            RADIOLOGY & ADDITIONAL TESTS:  Studies reviewed.

## 2023-04-10 NOTE — DISCHARGE NOTE PROVIDER - NSDCMRMEDTOKEN_GEN_ALL_CORE_FT
cefadroxil 500 mg oral capsule: 1 tab(s) orally 2 times a day  clindamycin 300 mg oral capsule: 1 tab(s) orally every 6 hours ***COURSE COMPLETE***  Multiple Vitamins oral tablet: 1 tab(s) orally once a day

## 2023-04-10 NOTE — DISCHARGE NOTE NURSING/CASE MANAGEMENT/SOCIAL WORK - NSDCPEFALRISK_GEN_ALL_CORE
For information on Fall & Injury Prevention, visit: [email or web address] OR  [email or web address] OR  https://www.cdc.gov/steadi/patient.html

## 2023-04-10 NOTE — PROGRESS NOTE ADULT - PROVIDER SPECIALTY LIST ADULT
Plastic Surgery
Surgery
Surgery
Heme/Onc
Plastic Surgery
SICU
SICU
Surgery
Heme/Onc
Plastic Surgery
Plastic Surgery
Surgery

## 2023-04-10 NOTE — DISCHARGE NOTE PROVIDER - NSDCACTIVITY_GEN_ALL_CORE
No restrictions/Stairs allowed/Walking - Indoors allowed/Walking - Outdoors allowed/Follow Instructions Provided by your Surgical Team

## 2023-04-10 NOTE — PROVIDER CONTACT NOTE (OTHER) - ACTION/TREATMENT ORDERED:
MD called and made ware. Verbal order to reinforce left thigh left MILAN dressing dressing, give extra supplies to the patient,  discharge pt home and follow with surgical team in a week.

## 2023-04-10 NOTE — PROVIDER CONTACT NOTE (OTHER) - ASSESSMENT
Left thigh MILAN drain leaking at the insertion site. MILAN drain stripped, emptied and decompressed. Pt denies any pain and discomfort. Dressing reinforced using a gauze and Tegaderm.

## 2023-04-10 NOTE — DISCHARGE NOTE PROVIDER - NSDCCPTREATMENT_GEN_ALL_CORE_FT
PRINCIPAL PROCEDURE  Procedure: Dissection of right inguinal region with biopsy of lymph node  Findings and Treatment:       SECONDARY PROCEDURE  Procedure: Myocutaneous flap, trunk  Findings and Treatment:     Procedure: Closure, surgical wound or dehiscence, abdomen, extensive or complex, secondary  Findings and Treatment:     Procedure: Excision of left inguinal lymph node  Findings and Treatment:

## 2023-04-10 NOTE — DISCHARGE NOTE NURSING/CASE MANAGEMENT/SOCIAL WORK - NSDCCRNAME_GEN_ALL_CORE_FT
discharge folder provided including private hire list, home care agency list, community and cancer resources

## 2023-04-11 NOTE — CDI QUERY NOTE - NSCDIOTHERTXTBX_GEN_ALL_CORE_HH
Further clarification is required regarding pathology findings. No documentation addressing the pathology findings were found in the medical record at the time of this review.    Chart Documentation:    Discharge provider note 4/10/2023   h/o recurrent serous ovarian cancer   right groin abscess  Ovarian cancer  Colon cancer    Pathology Findings:  Surgical Pathology Report:   ACCESSION No: [id number]   Patient: ELLIE DARLING   Collected Date/Time: 4/4/2023 14:29 EDT   Received Date/Time: 4/5/2023 08:28 EDT   Surgical Pathology Report - Auth (Verified)   Final Diagnosis   1. SKIN SOFT TISSUE AND LYMPH NODES (RIGHT INGUINAL CONTENTS):   - HIGH-GRADE PAPILLARY SEROUS CARCINOMA INVOLVING SOFT TISSUE.   - Skin with ulceration and underlying necrosis and acute inflammation.   - Three lymph nodes negative for malignancy (0/3); lymph nodes show stromal fibrosis consistent with therapy effect.   2. TISSUE (LEFT INGUINAL LYMPH NODE):   - HIGH-GRADE PAPILLARY SEROUS CARCINOMA WITH PSAMMOMATOUS CALCIFICATIONS, EXTENSIVE THROUGHOUT SPECIMEN.     Please clarify after further study, evaluation, and treatment if you concur with the Pathology findings.    - Concur with Pathology Findings Noting: HIGH-GRADE PAPILLARY SEROUS CARCINOMA INVOLVING SOFT TISSUE OF THE RIGHT GROIN. HIGH-GRADE PAPILLARY SEROUS CARCINOMA WITH PSAMMOMATOUS CALCIFICATIONS, EXTENSIVE THROUGHOUTTHE LEFT INGUINAL LYMPH NODE.   - Do Not Concur with Pathology Findings  - Other Please Specify  - Not Clinically Significant

## 2023-04-20 ENCOUNTER — APPOINTMENT (OUTPATIENT)
Dept: SURGICAL ONCOLOGY | Facility: CLINIC | Age: 83
End: 2023-04-20
Payer: COMMERCIAL

## 2023-04-20 VITALS — SYSTOLIC BLOOD PRESSURE: 120 MMHG | DIASTOLIC BLOOD PRESSURE: 74 MMHG

## 2023-04-20 PROCEDURE — 99024 POSTOP FOLLOW-UP VISIT: CPT

## 2023-04-24 NOTE — REASON FOR VISIT
[de-identified] :  Right groin exploration with excision of mass and gracilis flap, left groin excision of necrotic lymph node. [de-identified] : 4/4/2023  [de-identified] : 16

## 2023-04-24 NOTE — ASSESSMENT
[FreeTextEntry1] : 82 year old woman with known metastatic ovarian cancer to her bilateral inguinal nodes. Received palliative radiation and developed a non-healing groin sinus from necrotic/liquified nodes with vessel exposure. Now s/p right radical groin dissection with gracilis flap and left superficial groin dissection. Healing appropriately. 2/3 drains removed. Dr. Johnston will likely removed last right leg drain next week. Patient recommended for PT and VNS. I will discuss her oncologic systemic options with Dr. Clarke once she is fully healed.

## 2023-04-24 NOTE — CDI QUERY NOTE - NSCDIOTHERTXTBX_GEN_ALL_CORE_HH
The Physician's or Provider's documentation of the patient's presentation, evaluation and medical management, as identified below, may support a diagnosis that is not documented to the furthest specificity in the medical record. Please clarify in your progress notes and/or discharge summary if there is a corresponding diagnosis associated with the clinical information described below:    Can the patient's respiratory status be further clarified ?    -Acute respiratory failure  resolved  -Unable to rule out acute respiratory failure resolved                                     -Acute Respiratory Distress   -Other (specify)    SUPPORTING DOCUMENTATION AND/OR CLINICAL EVIDENCE:   3/30  HIGH FLOW 40, VENTI MASK 15 L NC 96% CHART NOTE EVENT Pt did not tolerate HFNC. MD advised, pt placed on Venti mask.    Pulse Oximeter on 3/30   RR: --20  SpO2: --94 on      Non-rebreather 3/30- 40 96% on 40 L NC   Venti Mask   3/30  HIGH FLOW 40, VENTI MASK 15 L NC 96% CHART NOTE EVENT Pt did not tolerate HFNC. MD advised, pt placed on Venti mask.    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 - She is c/o right groin pain with open, draining, foul smelling purulent drainage   now s/p Right groin exploration with excision of mass and gracilis flap, left groin excision of necrotic lymph node.    # h/o recurrent serous ovarian cancer - pt last received doxil/avastin on 1/27/23- will restart outpt once infection under control   # right groin abscess- 4/4/23 s/p radical dissection of groin and muscle flap as well as necrotic LN excision, pedicled right gracilis flap and complex closure of groin over mm flap 18 cm - completed cefazolin- afebrile past 24 hrs- pt concerned about drain remaining in place on dc- to discuss with plastics if can be removed - minimal output on the left- dc today with instructions on proper wound care and VNS services if cleared by plastics The Physician's or Provider's documentation of the patient's presentation, evaluation and medical management, as identified below, may support a diagnosis that is not documented to the furthest specificity in the medical record. Please clarify in your progress notes and/or discharge summary if there is a corresponding diagnosis associated with the clinical information described below:    Can the patient's respiratory status be further clarified ?    -Acute respiratory failure  resolved  -Unable to rule out acute respiratory failure resolved                                     -Acute Respiratory Distress   -Other (specify)    SUPPORTING DOCUMENTATION AND/OR CLINICAL EVIDENCE:   3/30  HIGH FLOW 40, VENTI MASK 15 L NC 96% CHART NOTE EVENT Pt did not tolerate HFNC. MD advised, pt placed on Venti mask.    Pulse Oximeter on 3/30   RR: --20  SpO2: --94 on 40 N     Non-rebreather 3/30- 40 96% on 40 L NC   Venti Mask   3/30  HIGH FLOW 40, VENTI MASK 15 L NC 96% CHART NOTE EVENT Pt did not tolerate HFNC. MD advised, pt placed on Venti mask.    CT -LOWER CHEST: Mild bibasilar dependent atelectasis.      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 - She is c/o right groin pain with open, draining, foul smelling purulent drainage   now s/p Right groin exploration with excision of mass and gracilis flap, left groin excision of necrotic lymph node.    # h/o recurrent serous ovarian cancer - pt last received doxil/avastin on 1/27/23- will restart outpt once infection under control   # right groin abscess- 4/4/23 s/p radical dissection of groin and muscle flap as well as necrotic LN excision, pedicled right gracilis flap and complex closure of groin over mm flap 18 cm - completed cefazolin- afebrile past 24 hrs- pt concerned about drain remaining in place on dc- to discuss with plastics if can be removed - minimal output on the left- dc today with instructions on proper wound care and VNS services if cleared by plastics

## 2023-04-24 NOTE — HISTORY OF PRESENT ILLNESS
[FreeTextEntry1] : Ms. 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 She presented to  with infection in right groin. - She is c/o right groin pain with open, draining, foul smelling purulent drainage. She underwent Right groin exploration with excision of mass and gracilis flap, left groin excision of necrotic lymph node. She was discharged home on with VNS and drain place. She is following up with plastics  Dr. Johnston. She reports she has been feeling well. Denies having any pain. Denies any recent SOB, CP, fever, chills, n/v/d. \par Drain output has been minimal from 2/3 drains, 2 drains and left groin staples removed in office.\par \par Pathology results discussed in great detail with the patient and written report given to patient\par

## 2023-04-24 NOTE — PHYSICAL EXAM
[Normal] : full range of motion and no deformities appreciated [de-identified] : b/l groin incisions. Left groin staples removed along with 2 MILAN drains; right thigh incision healing appropriately with serous MILAN drain and intact sutures

## 2023-04-25 ENCOUNTER — OUTPATIENT (OUTPATIENT)
Dept: OUTPATIENT SERVICES | Facility: HOSPITAL | Age: 83
LOS: 1 days | End: 2023-04-25
Payer: COMMERCIAL

## 2023-04-25 DIAGNOSIS — L02.214 CUTANEOUS ABSCESS OF GROIN: ICD-10-CM

## 2023-04-25 PROCEDURE — 10180 I&D COMPLEX PO WOUND INFCTJ: CPT | Mod: 59

## 2023-04-25 PROCEDURE — 99204 OFFICE O/P NEW MOD 45 MIN: CPT | Mod: 25
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
## 2023-04-28 DIAGNOSIS — Z87.891 PERSONAL HISTORY OF NICOTINE DEPENDENCE: ICD-10-CM

## 2023-04-28 DIAGNOSIS — Z92.21 PERSONAL HISTORY OF ANTINEOPLASTIC CHEMOTHERAPY: ICD-10-CM

## 2023-04-28 DIAGNOSIS — C56.9 MALIGNANT NEOPLASM OF UNSPECIFIED OVARY: ICD-10-CM

## 2023-04-28 DIAGNOSIS — T81.49XA INFECTION FOLLOWING A PROCEDURE, OTHER SURGICAL SITE, INITIAL ENCOUNTER: ICD-10-CM
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
## 2023-05-02 ENCOUNTER — OUTPATIENT (OUTPATIENT)
Dept: OUTPATIENT SERVICES | Facility: HOSPITAL | Age: 83
LOS: 1 days | End: 2023-05-02
Payer: COMMERCIAL

## 2023-05-02 DIAGNOSIS — T81.49XA INFECTION FOLLOWING A PROCEDURE, OTHER SURGICAL SITE, INITIAL ENCOUNTER: ICD-10-CM

## 2023-05-02 PROCEDURE — 99214 OFFICE O/P EST MOD 30 MIN: CPT

## 2023-05-03 DIAGNOSIS — Z92.21 PERSONAL HISTORY OF ANTINEOPLASTIC CHEMOTHERAPY: ICD-10-CM

## 2023-05-03 DIAGNOSIS — Z87.891 PERSONAL HISTORY OF NICOTINE DEPENDENCE: ICD-10-CM

## 2023-05-03 DIAGNOSIS — C56.9 MALIGNANT NEOPLASM OF UNSPECIFIED OVARY: ICD-10-CM

## 2023-05-03 DIAGNOSIS — T81.49XA INFECTION FOLLOWING A PROCEDURE, OTHER SURGICAL SITE, INITIAL ENCOUNTER: ICD-10-CM
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
## 2023-05-07 ENCOUNTER — EMERGENCY (EMERGENCY)
Facility: HOSPITAL | Age: 83
LOS: 1 days | Discharge: HOME | End: 2023-05-07
Attending: EMERGENCY MEDICINE
Payer: COMMERCIAL

## 2023-05-07 VITALS
HEART RATE: 85 BPM | TEMPERATURE: 97 F | DIASTOLIC BLOOD PRESSURE: 83 MMHG | OXYGEN SATURATION: 98 % | RESPIRATION RATE: 19 BRPM | SYSTOLIC BLOOD PRESSURE: 128 MMHG

## 2023-05-07 VITALS
DIASTOLIC BLOOD PRESSURE: 60 MMHG | HEART RATE: 89 BPM | HEIGHT: 60 IN | TEMPERATURE: 98 F | RESPIRATION RATE: 20 BRPM | WEIGHT: 134.92 LBS | OXYGEN SATURATION: 96 % | SYSTOLIC BLOOD PRESSURE: 117 MMHG

## 2023-05-07 LAB
ALBUMIN SERPL ELPH-MCNC: 3.2 G/DL — LOW (ref 3.3–5.2)
ALP SERPL-CCNC: 95 U/L — SIGNIFICANT CHANGE UP (ref 40–120)
ALT FLD-CCNC: 12 U/L — SIGNIFICANT CHANGE UP
ANION GAP SERPL CALC-SCNC: 13 MMOL/L — SIGNIFICANT CHANGE UP (ref 5–17)
APTT BLD: 29.5 SEC — SIGNIFICANT CHANGE UP (ref 27.5–35.5)
AST SERPL-CCNC: 22 U/L — SIGNIFICANT CHANGE UP
BASOPHILS # BLD AUTO: 0.06 K/UL — SIGNIFICANT CHANGE UP (ref 0–0.2)
BASOPHILS NFR BLD AUTO: 0.6 % — SIGNIFICANT CHANGE UP (ref 0–2)
BILIRUB SERPL-MCNC: 0.2 MG/DL — LOW (ref 0.4–2)
BLD GP AB SCN SERPL QL: SIGNIFICANT CHANGE UP
BUN SERPL-MCNC: 19.8 MG/DL — SIGNIFICANT CHANGE UP (ref 8–20)
CALCIUM SERPL-MCNC: 9.2 MG/DL — SIGNIFICANT CHANGE UP (ref 8.4–10.5)
CHLORIDE SERPL-SCNC: 98 MMOL/L — SIGNIFICANT CHANGE UP (ref 96–108)
CO2 SERPL-SCNC: 22 MMOL/L — SIGNIFICANT CHANGE UP (ref 22–29)
CREAT SERPL-MCNC: 0.72 MG/DL — SIGNIFICANT CHANGE UP (ref 0.5–1.3)
EGFR: 84 ML/MIN/1.73M2 — SIGNIFICANT CHANGE UP
EOSINOPHIL # BLD AUTO: 0.15 K/UL — SIGNIFICANT CHANGE UP (ref 0–0.5)
EOSINOPHIL NFR BLD AUTO: 1.6 % — SIGNIFICANT CHANGE UP (ref 0–6)
GLUCOSE SERPL-MCNC: 180 MG/DL — HIGH (ref 70–99)
HCT VFR BLD CALC: 35.3 % — SIGNIFICANT CHANGE UP (ref 34.5–45)
HGB BLD-MCNC: 11.8 G/DL — SIGNIFICANT CHANGE UP (ref 11.5–15.5)
IMM GRANULOCYTES NFR BLD AUTO: 0.4 % — SIGNIFICANT CHANGE UP (ref 0–0.9)
INR BLD: 1.09 RATIO — SIGNIFICANT CHANGE UP (ref 0.88–1.16)
LYMPHOCYTES # BLD AUTO: 1.02 K/UL — SIGNIFICANT CHANGE UP (ref 1–3.3)
LYMPHOCYTES # BLD AUTO: 10.9 % — LOW (ref 13–44)
MCHC RBC-ENTMCNC: 28.9 PG — SIGNIFICANT CHANGE UP (ref 27–34)
MCHC RBC-ENTMCNC: 33.4 GM/DL — SIGNIFICANT CHANGE UP (ref 32–36)
MCV RBC AUTO: 86.5 FL — SIGNIFICANT CHANGE UP (ref 80–100)
MONOCYTES # BLD AUTO: 0.9 K/UL — SIGNIFICANT CHANGE UP (ref 0–0.9)
MONOCYTES NFR BLD AUTO: 9.6 % — SIGNIFICANT CHANGE UP (ref 2–14)
NEUTROPHILS # BLD AUTO: 7.19 K/UL — SIGNIFICANT CHANGE UP (ref 1.8–7.4)
NEUTROPHILS NFR BLD AUTO: 76.9 % — SIGNIFICANT CHANGE UP (ref 43–77)
PLATELET # BLD AUTO: 496 K/UL — HIGH (ref 150–400)
POTASSIUM SERPL-MCNC: 4.7 MMOL/L — SIGNIFICANT CHANGE UP (ref 3.5–5.3)
POTASSIUM SERPL-SCNC: 4.7 MMOL/L — SIGNIFICANT CHANGE UP (ref 3.5–5.3)
PROT SERPL-MCNC: 6.3 G/DL — LOW (ref 6.6–8.7)
PROTHROM AB SERPL-ACNC: 12.6 SEC — SIGNIFICANT CHANGE UP (ref 10.5–13.4)
RBC # BLD: 4.08 M/UL — SIGNIFICANT CHANGE UP (ref 3.8–5.2)
RBC # FLD: 13.4 % — SIGNIFICANT CHANGE UP (ref 10.3–14.5)
SODIUM SERPL-SCNC: 133 MMOL/L — LOW (ref 135–145)
WBC # BLD: 9.36 K/UL — SIGNIFICANT CHANGE UP (ref 3.8–10.5)
WBC # FLD AUTO: 9.36 K/UL — SIGNIFICANT CHANGE UP (ref 3.8–10.5)

## 2023-05-07 PROCEDURE — 36415 COLL VENOUS BLD VENIPUNCTURE: CPT

## 2023-05-07 PROCEDURE — 99285 EMERGENCY DEPT VISIT HI MDM: CPT

## 2023-05-07 PROCEDURE — 85610 PROTHROMBIN TIME: CPT

## 2023-05-07 PROCEDURE — 86901 BLOOD TYPING SEROLOGIC RH(D): CPT

## 2023-05-07 PROCEDURE — 99284 EMERGENCY DEPT VISIT MOD MDM: CPT

## 2023-05-07 PROCEDURE — 85025 COMPLETE CBC W/AUTO DIFF WBC: CPT

## 2023-05-07 PROCEDURE — 86900 BLOOD TYPING SEROLOGIC ABO: CPT

## 2023-05-07 PROCEDURE — 80053 COMPREHEN METABOLIC PANEL: CPT

## 2023-05-07 PROCEDURE — 85730 THROMBOPLASTIN TIME PARTIAL: CPT

## 2023-05-07 PROCEDURE — 86850 RBC ANTIBODY SCREEN: CPT

## 2023-05-07 NOTE — ED PROVIDER NOTE - CLINICAL SUMMARY MEDICAL DECISION MAKING FREE TEXT BOX
82-year-old female  with history of recurrent ovarian cancer status post radiation to the groin presenting with wound check.   Patient without evidence of infection at wound sites.  Has had decreased MILAN output per her logs.  Patient seen by general surgery.  No need for CT of the area at this time.  MILAN drain pulled by surgery team. Their recommendation was Ace bandage compress and outpatient follow-up.  Patient reports that she has a follow-up appointment with her surgeon on Tuesday of this week.  She is medically stable for discharge at this time.  Strict return precautions given.  Patient reports that she will call a taxi to bring her home.

## 2023-05-07 NOTE — ED ADULT NURSE NOTE - OBJECTIVE STATEMENT
Assumed care of pt at 1640 Pt A&Ox4 c/o dislodging MILAN drain. Pt denies CP and SOB. RR Even and unlabored. MD Joe at bedside assessing pt and wound. Assumed care of pt at 1640 Pt A&Ox4 c/o dislodging MILAN drain. Pt denies CP and SOB. RR Even and unlabored. MD Joe at bedside assessing pt and wound. Right groin noted with minimal clear drainage. 4x4 placed on wound by MD. No odor noted, Pt afebrile in ED VS as charted.

## 2023-05-07 NOTE — ED PROVIDER NOTE - ATTENDING CONTRIBUTION TO CARE
wound check, patient with dislodged MILAN drain; seen by surgical resident, MILAN drain with only few centimeters of line still within the skin wound; fully removed by surgical resident; wound in right ground slightly dehisced with granulation tissue visible across a 2cm span, clear serous drainage, no blood, no purulence; site of MILAN drain also with slight serous drainage; no blood; no purulence; no overt signs of cellulitis; as per surgery, recommend continued wound care, and outpt f/u as scheduled later this week.    I personally saw the patient with the resident, and completed the key components of the history and physical exam. I then discussed the management plan with the resident.

## 2023-05-07 NOTE — ED PROVIDER NOTE - NSFOLLOWUPINSTRUCTIONS_ED_ALL_ED_FT
-Please follow-up with your surgeon in the next 1 week.     Fernando Johnston) Surgery  [facility]   Phone: [phone]    - SEEK IMMEDIATE MEDICAL CARE IF YOU HAVE ANY OF THE FOLLOWING SYMPTOMS: worsening fever, red streaks coming from affected area, vomiting or diarrhea, or dizziness/lightheadedness.

## 2023-05-07 NOTE — ED PROVIDER NOTE - OBJECTIVE STATEMENT
82-year-old female  with history of recurrent ovarian cancer status post radiation to the groin presenting with wound check.  Patient underwent drainage, exploration of right groin abscess was discharged with a right-sided MILAN drain.  Patient reports the drain got caught on something and then pulled out approximately 20 cm.  Per patient's records drain has had minimal output over the past week.  She is was to see her surgeon on Tuesday for follow-up.  She denies any fevers, chills, nausea, vomiting, abdominal pain.

## 2023-05-07 NOTE — CONSULT NOTE ADULT - SUBJECTIVE AND OBJECTIVE BOX
Plastic Surgery Consult    Consulting attending: Dr. 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 Pt is s/p s/p radical dissection of groin and muscle flap as well as necrotic LN excision, pedicled right gracilis flap and complex closure of groin on 4/4 at [facility]. She has had drain in place, with serous o/p (draining 60-80cc/day),  20 recorded from this morning. Patient drain became dislodged on this morning , prompting her to seek ED care.         PAST MEDICAL HISTORY:  Ovarian cancer          PAST SURGICAL HISTORY:        MEDICATIONS:        ALLERGIES:  penicillin (Unknown)        VITALS & I/Os:  Vital Signs Last 24 Hrs  T(C): 36.4 (07 May 2023 16:21), Max: 36.4 (07 May 2023 16:21)  T(F): 97.6 (07 May 2023 16:21), Max: 97.6 (07 May 2023 16:21)  HR: 89 (07 May 2023 16:21) (89 - 89)  BP: 117/60 (07 May 2023 16:21) (117/60 - 117/60)  BP(mean): --  RR: 20 (07 May 2023 16:21) (20 - 20)  SpO2: 96% (07 May 2023 16:21) (96% - 96%)    Parameters below as of 07 May 2023 16:21  Patient On (Oxygen Delivery Method): room air        I&O's Summary        PHYSICAL EXAM:  Constitutional: patient resting comfortably in bed, in no acute distress  Respiratory: respirations are unlabored, no accessory muscle use, no conversational dyspnea  Cardiovascular: regular rate & rhythm  Gastrointestinal: Abdomen soft, non-tender, non-distended, no rebound tenderness / guarding  Groin: staples in place, fibrinous appearing tissue   Neurological: A&O x 3; no gross sensory / motor / coordination deficits  Psych: anxious   Musculoskeletal/ext: MILAN dislosged medial R thigh, minimal serous drainage, incision of medial thigh otherwise healed

## 2023-05-07 NOTE — CONSULT NOTE ADULT - ASSESSMENT
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 Presents for dislodged MILAN drain.      Plan:   - No acute intervention         MILAN completely removed as it was dislodged ~10 cm   - Cover drain site with 4x4 and tape, mild compression of R thigh with ACE  - Follow up outpatient on this week with Plastic Surgeon         Fernando Johnston) Surgery       [facility]        Phone: [phone]    Plan discussed with on call Plastic Surgery Attending- Dr. Ramsay.

## 2023-05-07 NOTE — ED ADULT NURSE NOTE - NSFALLRSKPASTHIST_ED_ALL_ED
no dysphagia f/u conducted bedside. pt received alert, verbally responsive. O2 via NC. pt expressed tolerating current diet with no difficulty. pt prefers cut up food 2/2 -dentition. no c/o pain. moderate oral dysphagia with no overt s/s of aspiration vs penetration  pt prefers current diet consistency

## 2023-05-07 NOTE — ED PROVIDER NOTE - PATIENT PORTAL LINK FT
You can access the FollowMyHealth Patient Portal offered by [facility] by registering at the following website: [email or web address]. By joining [facility]’s FollowMyHealth portal, you will also be able to view your health information using other applications (apps) compatible with our system.

## 2023-05-07 NOTE — ED PROVIDER NOTE - PHYSICAL EXAMINATION
Gen: no acute distress  Head: normocephalic, atraumatic  EENT: EOMI  Lung: no increased work of breathing  CV: 2+ radial pulses b/l  Abd: soft, non-tender, non-distended, no rebound tenderness or guarding;  MSK: incision over right inguinal area with scant drainage; MILAN drain at medial thigh dislodged with broken suture noted; no evidence of infection  Neuro: A&Ox4; No focal neurologic deficits

## 2023-05-09 ENCOUNTER — OUTPATIENT (OUTPATIENT)
Dept: OUTPATIENT SERVICES | Facility: HOSPITAL | Age: 83
LOS: 1 days | End: 2023-05-09
Payer: COMMERCIAL

## 2023-05-09 DIAGNOSIS — T81.49XA INFECTION FOLLOWING A PROCEDURE, OTHER SURGICAL SITE, INITIAL ENCOUNTER: ICD-10-CM

## 2023-05-09 PROCEDURE — 99213 OFFICE O/P EST LOW 20 MIN: CPT

## 2023-05-15 DIAGNOSIS — T81.49XA INFECTION FOLLOWING A PROCEDURE, OTHER SURGICAL SITE, INITIAL ENCOUNTER: ICD-10-CM
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
## 2023-05-15 DIAGNOSIS — Z92.21 PERSONAL HISTORY OF ANTINEOPLASTIC CHEMOTHERAPY: ICD-10-CM

## 2023-05-15 DIAGNOSIS — Z87.891 PERSONAL HISTORY OF NICOTINE DEPENDENCE: ICD-10-CM

## 2023-05-15 DIAGNOSIS — C56.9 MALIGNANT NEOPLASM OF UNSPECIFIED OVARY: ICD-10-CM
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
## 2023-05-18 ENCOUNTER — APPOINTMENT (OUTPATIENT)
Dept: SURGICAL ONCOLOGY | Facility: CLINIC | Age: 83
End: 2023-05-18
Payer: COMMERCIAL

## 2023-05-18 ENCOUNTER — APPOINTMENT (OUTPATIENT)
Dept: VASCULAR SURGERY | Facility: CLINIC | Age: 83
End: 2023-05-18
Payer: COMMERCIAL

## 2023-05-18 VITALS
SYSTOLIC BLOOD PRESSURE: 132 MMHG | RESPIRATION RATE: 16 BRPM | DIASTOLIC BLOOD PRESSURE: 78 MMHG | HEART RATE: 87 BPM | OXYGEN SATURATION: 100 %

## 2023-05-18 DIAGNOSIS — C77.8 MALIGNANT NEOPLASM OF UNSPECIFIED OVARY: ICD-10-CM

## 2023-05-18 DIAGNOSIS — C56.9 MALIGNANT NEOPLASM OF UNSPECIFIED OVARY: ICD-10-CM

## 2023-05-18 PROCEDURE — 93971 EXTREMITY STUDY: CPT

## 2023-05-18 PROCEDURE — 99024 POSTOP FOLLOW-UP VISIT: CPT

## 2023-05-22 NOTE — PHYSICAL EXAM
[Normal] : oriented to person, place and time, with appropriate affect [de-identified] : right groin wound vac dressing. Left groin drainage noted dressing changed in office

## 2023-05-22 NOTE — HISTORY OF PRESENT ILLNESS
[de-identified] : Ms. 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 She presented to  with infection in right groin. - She is c/o right groin pain with open, draining, foul smelling purulent drainage. She underwent Right groin exploration with excision of mass and gracilis flap, left groin excision of necrotic lymph node. She was discharged home on with VNS and drain place. She is following up with plastics Dr. Johnston. She reports she has been feeling well. Denies having any pain. Denies any recent SOB, CP, fever, chills, n/v/d. \par Drain output has been minimal from 2/3 drains, 2 drains and left groin staples removed in office.\par \par Pathology results discussed in great detail with the patient and written report given to patient\par \par INTERIM 5/18/23: Pt is 6+ weeks post op. She presents to  the office for follow up visit. She reports she accidentally pulled the drain and presents to [facility] for evaluation. The drain was fully removed in ER. She followed up with Dr. Craft and had right groin wound vac placed. She noted to have right leg swelling will get Dupplex done today. \par \par

## 2023-05-22 NOTE — REVIEW OF SYSTEMS
[Negative] : Psychiatric [de-identified] : right groin wound vac dressing. Left groin drainage noted dressing changed in office

## 2023-05-22 NOTE — ASSESSMENT
[FreeTextEntry1] : Wounds healing appropriately. Now with VAC on right leg, output appropriate, good seal. Left incision with 2mm sinus draining clear lymphatic fluid. Dressing applied and wound-care instructions given. We performed a duplex of her RLE today (low suspicion of DVT) to rule out DVT before instituting lymphedema management. Patient advised of importance of exercise/PT, compressing stockings. Have placed an order for  lymphedema PT consult. Patient to return in 2-3 weeks for re-eval.

## 2023-05-22 NOTE — REVIEW OF SYSTEMS
[Negative] : Psychiatric [de-identified] : right groin wound vac dressing. Left groin drainage noted dressing changed in office

## 2023-05-22 NOTE — PHYSICAL EXAM
[Normal] : oriented to person, place and time, with appropriate affect [de-identified] : right groin wound vac dressing. Left groin drainage noted dressing changed in office

## 2023-05-22 NOTE — HISTORY OF PRESENT ILLNESS
[de-identified] : Ms. 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 She presented to  with infection in right groin. - She is c/o right groin pain with open, draining, foul smelling purulent drainage. She underwent Right groin exploration with excision of mass and gracilis flap, left groin excision of necrotic lymph node. She was discharged home on with VNS and drain place. She is following up with plastics Dr. Johnston. She reports she has been feeling well. Denies having any pain. Denies any recent SOB, CP, fever, chills, n/v/d. \par Drain output has been minimal from 2/3 drains, 2 drains and left groin staples removed in office.\par \par Pathology results discussed in great detail with the patient and written report given to patient\par \par INTERIM 5/18/23: Pt is 6+ weeks post op. She presents to  the office for follow up visit. She reports she accidentally pulled the drain and presents to [facility] for evaluation. The drain was fully removed in ER. She followed up with Dr. Craft and had right groin wound vac placed. She noted to have right leg swelling will get Dupplex done today. \par \par

## 2023-06-06 ENCOUNTER — OUTPATIENT (OUTPATIENT)
Dept: OUTPATIENT SERVICES | Facility: HOSPITAL | Age: 83
LOS: 1 days | End: 2023-06-06
Payer: COMMERCIAL

## 2023-06-06 DIAGNOSIS — T81.49XA INFECTION FOLLOWING A PROCEDURE, OTHER SURGICAL SITE, INITIAL ENCOUNTER: ICD-10-CM

## 2023-06-06 PROCEDURE — 97605 NEG PRS WND THER DME<=50SQCM: CPT

## 2023-06-15 DIAGNOSIS — Z92.21 PERSONAL HISTORY OF ANTINEOPLASTIC CHEMOTHERAPY: ICD-10-CM

## 2023-06-15 DIAGNOSIS — T81.49XA INFECTION FOLLOWING A PROCEDURE, OTHER SURGICAL SITE, INITIAL ENCOUNTER: ICD-10-CM

## 2023-06-15 DIAGNOSIS — Z87.891 PERSONAL HISTORY OF NICOTINE DEPENDENCE: ICD-10-CM
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
## 2023-06-15 DIAGNOSIS — C56.9 MALIGNANT NEOPLASM OF UNSPECIFIED OVARY: ICD-10-CM

## 2023-06-20 ENCOUNTER — OUTPATIENT (OUTPATIENT)
Dept: OUTPATIENT SERVICES | Facility: HOSPITAL | Age: 83
LOS: 1 days | End: 2023-06-20
Payer: COMMERCIAL

## 2023-06-20 DIAGNOSIS — T81.49XA INFECTION FOLLOWING A PROCEDURE, OTHER SURGICAL SITE, INITIAL ENCOUNTER: ICD-10-CM

## 2023-06-20 PROCEDURE — 97605 NEG PRS WND THER DME<=50SQCM: CPT
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
## 2023-06-21 DIAGNOSIS — C56.9 MALIGNANT NEOPLASM OF UNSPECIFIED OVARY: ICD-10-CM
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
## 2023-06-21 DIAGNOSIS — Z92.21 PERSONAL HISTORY OF ANTINEOPLASTIC CHEMOTHERAPY: ICD-10-CM

## 2023-06-21 DIAGNOSIS — Z87.891 PERSONAL HISTORY OF NICOTINE DEPENDENCE: ICD-10-CM

## 2023-06-21 DIAGNOSIS — T81.49XA INFECTION FOLLOWING A PROCEDURE, OTHER SURGICAL SITE, INITIAL ENCOUNTER: ICD-10-CM

## 2023-07-11 ENCOUNTER — OUTPATIENT (OUTPATIENT)
Dept: OUTPATIENT SERVICES | Facility: HOSPITAL | Age: 83
LOS: 1 days | End: 2023-07-11
Payer: COMMERCIAL

## 2023-07-11 DIAGNOSIS — C56.9 MALIGNANT NEOPLASM OF UNSPECIFIED OVARY: ICD-10-CM

## 2023-07-11 PROCEDURE — 97605 NEG PRS WND THER DME<=50SQCM: CPT

## 2023-07-12 DIAGNOSIS — Z92.21 PERSONAL HISTORY OF ANTINEOPLASTIC CHEMOTHERAPY: ICD-10-CM
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
## 2023-07-12 DIAGNOSIS — C56.9 MALIGNANT NEOPLASM OF UNSPECIFIED OVARY: ICD-10-CM

## 2023-07-12 DIAGNOSIS — T81.49XA INFECTION FOLLOWING A PROCEDURE, OTHER SURGICAL SITE, INITIAL ENCOUNTER: ICD-10-CM

## 2023-07-12 DIAGNOSIS — Z87.891 PERSONAL HISTORY OF NICOTINE DEPENDENCE: ICD-10-CM
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
## 2023-07-18 ENCOUNTER — OUTPATIENT (OUTPATIENT)
Dept: OUTPATIENT SERVICES | Facility: HOSPITAL | Age: 83
LOS: 1 days | End: 2023-07-18
Payer: COMMERCIAL

## 2023-07-18 DIAGNOSIS — C56.9 MALIGNANT NEOPLASM OF UNSPECIFIED OVARY: ICD-10-CM

## 2023-07-18 PROCEDURE — 11043 DBRDMT MUSC&/FSCA 1ST 20/<: CPT

## 2023-07-18 PROCEDURE — 17250 CHEM CAUT OF GRANLTJ TISSUE: CPT | Mod: 59
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
## 2023-07-19 DIAGNOSIS — C56.9 MALIGNANT NEOPLASM OF UNSPECIFIED OVARY: ICD-10-CM

## 2023-07-19 DIAGNOSIS — T81.49XA INFECTION FOLLOWING A PROCEDURE, OTHER SURGICAL SITE, INITIAL ENCOUNTER: ICD-10-CM
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
## 2023-07-19 DIAGNOSIS — Z92.21 PERSONAL HISTORY OF ANTINEOPLASTIC CHEMOTHERAPY: ICD-10-CM

## 2023-07-19 DIAGNOSIS — Z87.891 PERSONAL HISTORY OF NICOTINE DEPENDENCE: ICD-10-CM

## 2023-07-25 ENCOUNTER — NON-APPOINTMENT (OUTPATIENT)
Age: 83
End: 2023-07-25

## 2023-07-26 ENCOUNTER — APPOINTMENT (OUTPATIENT)
Dept: SURGICAL ONCOLOGY | Facility: CLINIC | Age: 83
End: 2023-07-26
Payer: COMMERCIAL

## 2023-07-26 VITALS
HEART RATE: 89 BPM | OXYGEN SATURATION: 98 % | DIASTOLIC BLOOD PRESSURE: 75 MMHG | RESPIRATION RATE: 18 BRPM | SYSTOLIC BLOOD PRESSURE: 156 MMHG | HEIGHT: 57 IN

## 2023-07-26 DIAGNOSIS — S31.109A UNSPECIFIED OPEN WOUND OF ABDOMINAL WALL, UNSPECIFIED QUADRANT W/OUT PENETRATION INTO PERITONEAL CAVITY, INITIAL ENCOUNTER: ICD-10-CM

## 2023-07-26 PROCEDURE — 99212 OFFICE O/P EST SF 10 MIN: CPT

## 2023-07-26 NOTE — HISTORY OF PRESENT ILLNESS
[de-identified] : I am seeing Ms. Monroe while covering for Dr Davis. She presents to the office for follow up visit. She underwent Right groin exploration with excision of mass and gracilis flap, left groin excision of necrotic lymph node on 4/4/23. She had wound vac in place that was managed at the wound care center. Last week she completed the therapy with wound vac, incision closed. Reports 3 days she noticed bleeding and tan drainage from her right groin, reports there a "hole". She denies having any recent SOB, CP, fever, chills, n/v/d.  She was seen in the office today due to concerns for increase serosanguineous drainage from the site. \par

## 2023-07-26 NOTE — END OF VISIT
[FreeTextEntry3] : I have seen and examined this patient with our NP Trish Cyr. The note contains my exam findings, impression and plan for this patient.\par

## 2023-07-26 NOTE — ASSESSMENT
[FreeTextEntry1] : Postoperative Right Inguinal Wound\par \par Small opening from the wound noted in the right groin, treated with silver nitrate, antibiotic ointment and then dressed with gauze and sterile dressing. She has upcoming appointment to see Dr. Johnston next week and she is following at [facility] rehab for lymphedema massage.

## 2023-07-26 NOTE — REVIEW OF SYSTEMS
[Negative] : Psychiatric [de-identified] : walks with assistance [de-identified] : right groin small opening noted

## 2023-07-26 NOTE — PHYSICAL EXAM
[Normal] : oriented to person, place and time, with appropriate affect [de-identified] : Swollen right leg with lymphedema. Small opening from the wound noted in the right groin, treated with silver nitrate and then dressed with gauze and sterile dressing.  [de-identified] : right groin small opening of the wound noted, with serous sanguinous drainage

## 2023-08-15 ENCOUNTER — OUTPATIENT (OUTPATIENT)
Dept: OUTPATIENT SERVICES | Facility: HOSPITAL | Age: 83
LOS: 1 days | End: 2023-08-15
Payer: COMMERCIAL

## 2023-08-15 DIAGNOSIS — C56.9 MALIGNANT NEOPLASM OF UNSPECIFIED OVARY: ICD-10-CM

## 2023-08-15 PROCEDURE — 17250 CHEM CAUT OF GRANLTJ TISSUE: CPT

## 2023-08-21 DIAGNOSIS — L92.9 GRANULOMATOUS DISORDER OF THE SKIN AND SUBCUTANEOUS TISSUE, UNSPECIFIED: ICD-10-CM

## 2023-08-21 DIAGNOSIS — Z87.891 PERSONAL HISTORY OF NICOTINE DEPENDENCE: ICD-10-CM
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
## 2023-08-21 DIAGNOSIS — Z92.21 PERSONAL HISTORY OF ANTINEOPLASTIC CHEMOTHERAPY: ICD-10-CM

## 2023-08-21 DIAGNOSIS — T81.49XA INFECTION FOLLOWING A PROCEDURE, OTHER SURGICAL SITE, INITIAL ENCOUNTER: ICD-10-CM
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
## 2023-08-21 DIAGNOSIS — C56.9 MALIGNANT NEOPLASM OF UNSPECIFIED OVARY: ICD-10-CM

## 2023-09-12 ENCOUNTER — OUTPATIENT (OUTPATIENT)
Dept: OUTPATIENT SERVICES | Facility: HOSPITAL | Age: 83
LOS: 1 days | End: 2023-09-12
Payer: COMMERCIAL

## 2023-09-12 DIAGNOSIS — C56.9 MALIGNANT NEOPLASM OF UNSPECIFIED OVARY: ICD-10-CM

## 2023-09-12 PROCEDURE — 99212 OFFICE O/P EST SF 10 MIN: CPT

## 2023-09-19 ENCOUNTER — NON-APPOINTMENT (OUTPATIENT)
Age: 83
End: 2023-09-19

## 2023-09-21 DIAGNOSIS — L92.9 GRANULOMATOUS DISORDER OF THE SKIN AND SUBCUTANEOUS TISSUE, UNSPECIFIED: ICD-10-CM

## 2023-09-21 DIAGNOSIS — T81.49XA INFECTION FOLLOWING A PROCEDURE, OTHER SURGICAL SITE, INITIAL ENCOUNTER: ICD-10-CM

## 2023-09-21 DIAGNOSIS — C56.9 MALIGNANT NEOPLASM OF UNSPECIFIED OVARY: ICD-10-CM

## 2023-09-21 DIAGNOSIS — Z92.21 PERSONAL HISTORY OF ANTINEOPLASTIC CHEMOTHERAPY: ICD-10-CM
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
## 2023-09-21 DIAGNOSIS — Z87.891 PERSONAL HISTORY OF NICOTINE DEPENDENCE: ICD-10-CM
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
## 2023-09-26 ENCOUNTER — OUTPATIENT (OUTPATIENT)
Dept: OUTPATIENT SERVICES | Facility: HOSPITAL | Age: 83
LOS: 1 days | End: 2023-09-26
Payer: COMMERCIAL

## 2023-09-26 DIAGNOSIS — C56.9 MALIGNANT NEOPLASM OF UNSPECIFIED OVARY: ICD-10-CM

## 2023-09-26 DIAGNOSIS — T81.49XA INFECTION FOLLOWING A PROCEDURE, OTHER SURGICAL SITE, INITIAL ENCOUNTER: ICD-10-CM

## 2023-09-26 PROCEDURE — 17250 CHEM CAUT OF GRANLTJ TISSUE: CPT

## 2023-10-04 DIAGNOSIS — T81.49XA INFECTION FOLLOWING A PROCEDURE, OTHER SURGICAL SITE, INITIAL ENCOUNTER: ICD-10-CM

## 2023-10-04 DIAGNOSIS — C56.9 MALIGNANT NEOPLASM OF UNSPECIFIED OVARY: ICD-10-CM

## 2023-10-04 DIAGNOSIS — Z87.891 PERSONAL HISTORY OF NICOTINE DEPENDENCE: ICD-10-CM

## 2023-10-04 DIAGNOSIS — Z92.21 PERSONAL HISTORY OF ANTINEOPLASTIC CHEMOTHERAPY: ICD-10-CM

## 2023-10-04 DIAGNOSIS — L92.9 GRANULOMATOUS DISORDER OF THE SKIN AND SUBCUTANEOUS TISSUE, UNSPECIFIED: ICD-10-CM
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
## 2024-02-06 NOTE — PHYSICAL THERAPY INITIAL EVALUATION ADULT - LEVEL OF INDEPENDENCE: SIT/STAND, REHAB EVAL
-- DO NOT REPLY / DO NOT REPLY ALL --  -- Message is from Engagement Center Operations (ECO) --    General Patient Message: Patient is calling wanted to establish care with Dr Crater let patient know Dr Carter is not accepting new patient per clinic staff Patient would like to see Dr Carter due to her daughter being a patient of hers. Please follow up recommended different doctor patient declined      Caller Information         Type Contact Phone/Fax    02/06/2024 10:19 AM CST Phone (Incoming) Jammie Dinero (Self) [phone] (H)          Alternative phone number: no    Can a detailed message be left? Yes    Message Turnaround:     Is it Working Hours? Yes - Working Hours                     
independent
moderate assist (50% patients effort)

## 2024-10-10 NOTE — PROGRESS NOTE ADULT - ASSESSMENT
83 YO F with abscess of right groin and mass left groin, with remote history of ovarian cancer s/p PITA/BSO.    Plan:  [place] [facility] [place]  IV Antibiotics  If no transfer will possibly biopsy left groin mass and drain right groin abscess today vs tmr    Case discussed with Dr Davis Dizziness blurred vision x 3 days since starting Lorazepam

## 2025-01-06 NOTE — PHYSICAL THERAPY INITIAL EVALUATION ADULT - PHYSICAL ASSIST/NONPHYSICAL ASSIST: SCOOT/BRIDGE, REHAB EVAL
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  Patient has no breast complaints today.  Her most recent mammogram had no abnormal findings.    Patient was offered the opportunity to follow-up with her PCM who would also order her screening mammogram.  Patient declined at this time would like to continue follow-up in the breast surgery clinic.    Her screening mammogram was ordered for January 2026.  Patient was instructed to contact the clinic should she have any abnormal breast findings in the interim as a diagnostic mammogram would then be ordered.  Patient indicated understanding of the above.  Orders:  •  Mammo screening bilateral w 3d and cad; Future  
I reviewed patient's most recent DEXA scan from 7/20/2023.  Patient is aware she is due for a DEXA scan this upcoming summer.  Order has already been placed.  Patient has no current fractures.     1. Osteoporosis.     2.  Since a DXA study from 5/21/2021, there has been:  A  STATISTICALLY SIGNIFICANT INCREASE in bone mineral density of 0.054 g/cm2 (8.5%) in the left total hip.     
verbal cues/1 person assist